# Patient Record
Sex: MALE | Race: WHITE | NOT HISPANIC OR LATINO | Employment: OTHER | URBAN - METROPOLITAN AREA
[De-identification: names, ages, dates, MRNs, and addresses within clinical notes are randomized per-mention and may not be internally consistent; named-entity substitution may affect disease eponyms.]

---

## 2017-07-31 ENCOUNTER — TELEPHONE (OUTPATIENT)
Dept: FAMILY MEDICINE CLINIC | Facility: CLINIC | Age: 67
End: 2017-07-31

## 2017-07-31 RX ORDER — AMOXICILLIN 500 MG/1
CAPSULE ORAL
Qty: 12 CAPSULE | Refills: 0 | Status: SHIPPED | OUTPATIENT
Start: 2017-07-31 | End: 2017-07-31 | Stop reason: SDUPTHER

## 2017-07-31 RX ORDER — AMOXICILLIN 500 MG/1
CAPSULE ORAL
Qty: 12 CAPSULE | Refills: 0 | Status: SHIPPED | OUTPATIENT
Start: 2017-07-31 | End: 2017-08-28 | Stop reason: SDUPTHER

## 2017-08-28 ENCOUNTER — TELEPHONE (OUTPATIENT)
Dept: FAMILY MEDICINE CLINIC | Facility: CLINIC | Age: 67
End: 2017-08-28

## 2017-08-28 RX ORDER — AMOXICILLIN 500 MG/1
CAPSULE ORAL
Qty: 12 CAPSULE | Refills: 0 | Status: SHIPPED | OUTPATIENT
Start: 2017-08-28 | End: 2019-02-22

## 2017-08-28 RX ORDER — AMOXICILLIN 500 MG/1
CAPSULE ORAL
Qty: 12 CAPSULE | Refills: 0 | Status: SHIPPED | OUTPATIENT
Start: 2017-08-28 | End: 2017-08-28 | Stop reason: SDUPTHER

## 2017-08-28 NOTE — TELEPHONE ENCOUNTER
abx for dental work needed.   Has had this in the past - amoxicillan 500mg wants 12-16 as he takes 4 each time he goes to the dentist.

## 2017-10-23 ENCOUNTER — FLU SHOT (OUTPATIENT)
Dept: FAMILY MEDICINE CLINIC | Facility: CLINIC | Age: 67
End: 2017-10-23

## 2017-10-23 PROCEDURE — 90662 IIV NO PRSV INCREASED AG IM: CPT | Performed by: NURSE PRACTITIONER

## 2017-10-23 PROCEDURE — 90471 IMMUNIZATION ADMIN: CPT | Performed by: NURSE PRACTITIONER

## 2017-11-27 ENCOUNTER — LAB (OUTPATIENT)
Dept: LAB | Facility: HOSPITAL | Age: 67
End: 2017-11-27

## 2017-11-27 ENCOUNTER — OFFICE VISIT (OUTPATIENT)
Dept: FAMILY MEDICINE CLINIC | Facility: CLINIC | Age: 67
End: 2017-11-27

## 2017-11-27 VITALS
HEIGHT: 72 IN | HEART RATE: 66 BPM | WEIGHT: 171.8 LBS | SYSTOLIC BLOOD PRESSURE: 128 MMHG | BODY MASS INDEX: 23.27 KG/M2 | DIASTOLIC BLOOD PRESSURE: 82 MMHG | TEMPERATURE: 98.1 F | OXYGEN SATURATION: 97 %

## 2017-11-27 DIAGNOSIS — E78.5 DYSLIPIDEMIA: ICD-10-CM

## 2017-11-27 DIAGNOSIS — Z11.59 NEED FOR HEPATITIS C SCREENING TEST: ICD-10-CM

## 2017-11-27 DIAGNOSIS — Z12.5 SCREENING FOR PROSTATE CANCER: ICD-10-CM

## 2017-11-27 DIAGNOSIS — Z23 NEED FOR PNEUMOCOCCAL VACCINATION: ICD-10-CM

## 2017-11-27 DIAGNOSIS — Z00.00 WELCOME TO MEDICARE PREVENTIVE VISIT: Primary | ICD-10-CM

## 2017-11-27 DIAGNOSIS — N40.1 BENIGN PROSTATIC HYPERPLASIA WITH NOCTURIA: ICD-10-CM

## 2017-11-27 DIAGNOSIS — K21.9 GASTROESOPHAGEAL REFLUX DISEASE, ESOPHAGITIS PRESENCE NOT SPECIFIED: ICD-10-CM

## 2017-11-27 DIAGNOSIS — K22.2 SCHATZKI'S RING: ICD-10-CM

## 2017-11-27 DIAGNOSIS — R35.1 BENIGN PROSTATIC HYPERPLASIA WITH NOCTURIA: ICD-10-CM

## 2017-11-27 PROBLEM — N20.0 RENAL STONES: Status: ACTIVE | Noted: 2017-11-27

## 2017-11-27 PROBLEM — I34.0 NON-RHEUMATIC MITRAL REGURGITATION: Status: ACTIVE | Noted: 2017-11-27

## 2017-11-27 PROBLEM — K57.30 DIVERTICULOSIS OF LARGE INTESTINE WITHOUT HEMORRHAGE: Status: ACTIVE | Noted: 2017-11-27

## 2017-11-27 PROBLEM — I05.1 RHEUMATIC MITRAL REGURGITATION: Status: ACTIVE | Noted: 2017-11-27

## 2017-11-27 PROBLEM — Z86.79 HISTORY OF RHEUMATIC FEVER: Status: ACTIVE | Noted: 2017-11-27

## 2017-11-27 LAB
ALBUMIN SERPL-MCNC: 4.6 G/DL (ref 3.2–4.8)
ALBUMIN/GLOB SERPL: 1.7 G/DL (ref 1.5–2.5)
ALP SERPL-CCNC: 76 U/L (ref 25–100)
ALT SERPL W P-5'-P-CCNC: 33 U/L (ref 7–40)
ANION GAP SERPL CALCULATED.3IONS-SCNC: 3 MMOL/L (ref 3–11)
ARTICHOKE IGE QN: 79 MG/DL (ref 0–130)
AST SERPL-CCNC: 28 U/L (ref 0–33)
BILIRUB SERPL-MCNC: 0.9 MG/DL (ref 0.3–1.2)
BUN BLD-MCNC: 17 MG/DL (ref 9–23)
BUN/CREAT SERPL: 17 (ref 7–25)
CALCIUM SPEC-SCNC: 9.2 MG/DL (ref 8.7–10.4)
CHLORIDE SERPL-SCNC: 108 MMOL/L (ref 99–109)
CHOLEST SERPL-MCNC: 149 MG/DL (ref 0–200)
CO2 SERPL-SCNC: 32 MMOL/L (ref 20–31)
CREAT BLD-MCNC: 1 MG/DL (ref 0.6–1.3)
GFR SERPL CREATININE-BSD FRML MDRD: 75 ML/MIN/1.73
GLOBULIN UR ELPH-MCNC: 2.7 GM/DL
GLUCOSE BLD-MCNC: 91 MG/DL (ref 70–100)
HCV AB SER DONR QL: NORMAL
HDLC SERPL-MCNC: 58 MG/DL (ref 40–60)
POTASSIUM BLD-SCNC: 4.7 MMOL/L (ref 3.5–5.5)
PROT SERPL-MCNC: 7.3 G/DL (ref 5.7–8.2)
PSA SERPL-MCNC: 0.55 NG/ML (ref 0–4)
SODIUM BLD-SCNC: 143 MMOL/L (ref 132–146)
TRIGL SERPL-MCNC: 85 MG/DL (ref 0–150)

## 2017-11-27 PROCEDURE — 90670 PCV13 VACCINE IM: CPT | Performed by: NURSE PRACTITIONER

## 2017-11-27 PROCEDURE — 80053 COMPREHEN METABOLIC PANEL: CPT

## 2017-11-27 PROCEDURE — 80061 LIPID PANEL: CPT

## 2017-11-27 PROCEDURE — G0402 INITIAL PREVENTIVE EXAM: HCPCS | Performed by: NURSE PRACTITIONER

## 2017-11-27 PROCEDURE — 86803 HEPATITIS C AB TEST: CPT

## 2017-11-27 PROCEDURE — 36415 COLL VENOUS BLD VENIPUNCTURE: CPT

## 2017-11-27 PROCEDURE — G0009 ADMIN PNEUMOCOCCAL VACCINE: HCPCS | Performed by: NURSE PRACTITIONER

## 2017-11-27 PROCEDURE — 84153 ASSAY OF PSA TOTAL: CPT

## 2017-11-27 RX ORDER — ATORVASTATIN CALCIUM 20 MG/1
TABLET, FILM COATED ORAL
Refills: 0 | COMMUNITY
Start: 2017-08-29 | End: 2018-01-12 | Stop reason: SDUPTHER

## 2017-11-27 RX ORDER — FLUTICASONE PROPIONATE 50 MCG
2 SPRAY, SUSPENSION (ML) NASAL AS NEEDED
COMMUNITY

## 2017-11-27 NOTE — PROGRESS NOTES
QUICK REFERENCE INFORMATION:  The ABCs of the Annual Wellness Visit    [facility] to Medicare Visit    HEALTH RISK ASSESSMENT    1950    Recent Hospitalizations:  No hospitalization(s) within the last year..      Current Medical Providers:  Patient Care Team:  Vasquez Hoff MD (Inactive) as PCP - General (Internal Medicine)      Smoking Status:  History   Smoking Status   • Never Smoker   Smokeless Tobacco   • Never Used       Alcohol Consumption:  History   Alcohol Use   • Yes     Comment: occasional       Depression Screen:   PHQ-2/PHQ-9 Depression Screening 11/27/2017   Little interest or pleasure in doing things 0   Feeling down, depressed, or hopeless 0   Total Score 0       Health Habits and Functional and Cognitive Screening:  Functional & Cognitive Status 11/27/2017   Do you have difficulty preparing food and eating? No   Do you have difficulty bathing yourself, getting dressed or grooming yourself? No   Do you have difficulty using the toilet? No   Do you have difficulty moving around from place to place? No   Do you have trouble with steps or getting out of a bed or a chair? No   In the past year have you fallen or experienced a near fall? No   Current Diet Well Balanced Diet   Dental Exam Up to date   Eye Exam Not up to date   Exercise (times per week) 3 times per week   Current Exercise Activities Include Bicycling Outdoors   Do you need help using the phone?  No   Are you deaf or do you have serious difficulty hearing?  No   Do you need help with transportation? No   Do you need help shopping? No   Do you need help preparing meals?  No   Do you need help with housework?  No   Do you need help with laundry? No   Do you need help taking your medications? No   Do you need help managing money? No   Have you felt unusual stress, anger or loneliness in the last month? No   Who do you live with? Spouse   If you need help, do you have trouble finding someone available to you? No   Have you been bothered in  the last four weeks by sexual problems? No   Do you have difficulty concentrating, remembering or making decisions? No           Does the patient have evidence of cognitive impairment? No    Aspirin use counseling? Does not need ASA (and currently is not on it)      Recent Lab Results:  CMP:  Lab Results   Component Value Date    BUN 17 11/27/2017    CREATININE 1.00 11/27/2017    EGFRIFNONA 75 11/27/2017    BCR 17.0 11/27/2017     11/27/2017    K 4.7 11/27/2017    CO2 32.0 (H) 11/27/2017    CALCIUM 9.2 11/27/2017    ALBUMIN 4.60 11/27/2017    LABIL2 1.7 11/27/2017    BILITOT 0.9 11/27/2017    ALKPHOS 76 11/27/2017    AST 28 11/27/2017    ALT 33 11/27/2017     Lipid Panel:  Lab Results   Component Value Date    CHOL 149 11/27/2017    TRIG 85 11/27/2017    HDL 58 11/27/2017    LDLDIRECT 79 11/27/2017     HbA1c:       Visual Acuity:  No exam data present    Age-appropriate Screening Schedule:  Refer to the list below for future screening recommendations based on patient's age, sex and/or medical conditions. Orders for these recommended tests are listed in the plan section. The patient has been provided with a written plan.    Health Maintenance   Topic Date Due   • PNEUMOCOCCAL VACCINES (65+ LOW/MEDIUM RISK) (2 of 2 - PPSV23) 11/27/2018   • TDAP/TD VACCINES (2 - Td) 01/01/2020   • COLONOSCOPY  02/01/2023   • INFLUENZA VACCINE  Completed   • ZOSTER VACCINE  Completed        Subjective   History of Present Illness    Claude Lucas is a 67 y.o. male an established patient presenting for a Welcome to Medicare Visit.     The following portions of the patient's history were reviewed and updated as appropriate: allergies, current medications, past family history, past medical history, past social history, past surgical history and problem list.    Outpatient Medications Prior to Visit   Medication Sig Dispense Refill   • amoxicillin (AMOXIL) 500 MG capsule 4 tablets by mouth one hour before procedure 12 capsule 0      No facility-administered medications prior to visit.        Patient Active Problem List   Diagnosis   • Rheumatic mitral regurgitation   • Benign prostatic hyperplasia with nocturia   • Diverticulosis of large intestine without hemorrhage   • Renal stones   • Gastroesophageal reflux disease   • Schatzki's ring   • History of rheumatic fever       Advance Care Planning:  has an advance directive - a copy HAS NOT been provided. Have asked the patient to send this to us to add to record.    Identification of Risk Factors:  Risk factors include: increased fall risk.    Review of Systems   Constitutional: Negative for appetite change, chills, fatigue and fever.   HENT: Negative for congestion, ear pain, rhinorrhea, sinus pressure and sore throat.    Eyes: Negative for itching and visual disturbance.   Respiratory: Negative for cough, shortness of breath and wheezing.    Cardiovascular: Negative for chest pain, palpitations and leg swelling.   Gastrointestinal: Negative for abdominal pain, constipation, diarrhea, nausea and vomiting.   Endocrine: Negative for cold intolerance and heat intolerance.   Genitourinary: Negative for difficulty urinating, dysuria and hematuria.   Musculoskeletal: Negative for arthralgias, back pain, joint swelling and myalgias.   Skin: Negative for rash and wound.   Allergic/Immunologic: Negative for environmental allergies and food allergies.   Neurological: Negative for dizziness and headaches.   Hematological: Negative for adenopathy. Does not bruise/bleed easily.   Psychiatric/Behavioral: Negative for sleep disturbance. The patient is not nervous/anxious.        Compared to one year ago, the patient feels his physical health is worse.  Compared to one year ago, the patient feels his mental health is better.    Objective    Physical Exam   Constitutional: He is oriented to person, place, and time. He appears well-developed and well-nourished. No distress.   HENT:   Head: Normocephalic and  "atraumatic.   Right Ear: External ear normal.   Left Ear: External ear normal.   Nose: Nose normal.   Mouth/Throat: Oropharynx is clear and moist.   Eyes: Conjunctivae and EOM are normal. Pupils are equal, round, and reactive to light. Right eye exhibits no discharge. Left eye exhibits no discharge. No scleral icterus.   Neck: Normal range of motion. Neck supple. No JVD (no bruits) present. No tracheal deviation present. No thyromegaly present.   Cardiovascular: Normal rate, regular rhythm and intact distal pulses.  Exam reveals no gallop and no friction rub.    No murmur heard.  Pulmonary/Chest: Effort normal and breath sounds normal. No respiratory distress. He has no wheezes. He has no rales. He exhibits no tenderness. Right breast exhibits no inverted nipple, no mass, no nipple discharge, no skin change and no tenderness. Left breast exhibits no inverted nipple, no mass, no nipple discharge, no skin change and no tenderness. Breasts are symmetrical.   Abdominal: Soft. Normal appearance and bowel sounds are normal. He exhibits no distension and no mass. There is no hepatosplenomegaly. There is no tenderness. There is no rebound and no guarding. No hernia.   Genitourinary:   Genitourinary Comments: Discussed and deferred   Musculoskeletal: Normal range of motion. He exhibits no edema, tenderness or deformity.   Lymphadenopathy:     He has no cervical adenopathy.   Neurological: He is alert and oriented to person, place, and time. He has normal reflexes. He displays normal reflexes.   Skin: Skin is warm and dry. No rash noted. He is not diaphoretic. No erythema. No pallor.   Psychiatric: He has a normal mood and affect. His behavior is normal. Judgment and thought content normal.   Nursing note and vitals reviewed.      Vitals:    11/27/17 0808   BP: 128/82   Pulse: 66   Temp: 98.1 °F (36.7 °C)   TempSrc: Temporal Artery    SpO2: 97%   Weight: 171 lb 12.8 oz (77.9 kg)   Height: 72\" (182.9 cm)   PainSc: 0-No pain "       Body mass index is 23.3 kg/(m^2).  Discussed the patient's BMI with him. The BMI is in the acceptable range.    Procedure   Procedures       Assessment/Plan   Patient Self-Management and Personalized Health Advice  The patient has been provided with information about: fall prevention and preventive services including:   · Advance directive, Fall Risk assessment done, Fall Risk plan of care done, Influenza vaccine, Pneumococcal vaccine , TdaP vaccine, Zostavax vaccine (Herpes Zoster).    Visit Diagnoses:    ICD-10-CM ICD-9-CM   1. Welcome to Medicare preventive visit Z00.00 V70.0   2. Benign prostatic hyperplasia with nocturia N40.1 600.01    R35.1 788.43   3. Gastroesophageal reflux disease, esophagitis presence not specified K21.9 530.81   4. Schatzki's ring K22.2 750.3   5. Need for hepatitis C screening test Z11.59 V73.89   6. Screening for prostate cancer Z12.5 V76.44   7. Dyslipidemia E78.5 272.4   8. Need for pneumococcal vaccination Z23 V03.82       Orders Placed This Encounter   Procedures   • Comprehensive Metabolic Panel     Standing Status:   Future     Number of Occurrences:   1     Standing Expiration Date:   2018   • HCV Antibody Rfx To Qnt PCR     Standing Status:   Future     Number of Occurrences:   1     Standing Expiration Date:   2018   • Lipid Panel     Standing Status:   Future     Number of Occurrences:   1     Standing Expiration Date:   2018   • PSA     Standing Status:   Future     Number of Occurrences:   1     Standing Expiration Date:   2017       Outpatient Encounter Prescriptions as of 2017   Medication Sig Dispense Refill   • amoxicillin (AMOXIL) 500 MG capsule 4 tablets by mouth one hour before procedure 12 capsule 0   • atorvastatin (LIPITOR) 20 MG tablet TK 1 T PO QD  0   • fluticasone (FLONASE) 50 MCG/ACT nasal spray 2 sprays into each nostril As Needed for Rhinitis.     • [] pneumococcal conj. 13-valent (PREVNAR-13) vaccine 0.5 mL         No facility-administered encounter medications on file as of 11/27/2017.        Reviewed use of high risk medication in the elderly: not applicable  Reviewed for potential of harmful drug interactions in the elderly: not applicable    Follow Up:  Return in about 1 year (around 11/27/2018), or if symptoms worsen or fail to improve, for Recheck, Annual physical, Medicare Wellness.     An After Visit Summary and PPPS with all of these plans were given to the patient.

## 2018-01-12 RX ORDER — ATORVASTATIN CALCIUM 20 MG/1
20 TABLET, FILM COATED ORAL DAILY
Qty: 90 TABLET | Refills: 1 | Status: SHIPPED | OUTPATIENT
Start: 2018-01-12 | End: 2018-07-16 | Stop reason: SDUPTHER

## 2018-01-12 NOTE — TELEPHONE ENCOUNTER
E-Rx Atorvastatin 20 mg tablets, take 1 tablet po qd #90, 1 refill to [facility] on [facility].

## 2018-01-12 NOTE — TELEPHONE ENCOUNTER
ATORVASTATIN 20 MG TAKE1 PER DAY, 90 DAY SUPPLY     PHARMACY HAS SENT IN A REQUEST LAST WEEK. NOW PATIENT HAS NO MEDS LEFT.     [address]

## 2018-07-16 ENCOUNTER — TELEPHONE (OUTPATIENT)
Dept: FAMILY MEDICINE CLINIC | Facility: CLINIC | Age: 68
End: 2018-07-16

## 2018-07-16 RX ORDER — ATORVASTATIN CALCIUM 20 MG/1
20 TABLET, FILM COATED ORAL DAILY
Qty: 90 TABLET | Refills: 1 | Status: SHIPPED | OUTPATIENT
Start: 2018-07-16 | End: 2019-01-24 | Stop reason: SDUPTHER

## 2018-10-11 ENCOUNTER — FLU SHOT (OUTPATIENT)
Dept: FAMILY MEDICINE CLINIC | Facility: CLINIC | Age: 68
End: 2018-10-11

## 2018-10-11 DIAGNOSIS — Z23 FLU VACCINE NEED: Primary | ICD-10-CM

## 2018-10-11 PROCEDURE — 90662 IIV NO PRSV INCREASED AG IM: CPT | Performed by: NURSE PRACTITIONER

## 2018-10-11 PROCEDURE — G0008 ADMIN INFLUENZA VIRUS VAC: HCPCS | Performed by: NURSE PRACTITIONER

## 2019-01-24 RX ORDER — ATORVASTATIN CALCIUM 20 MG/1
TABLET, FILM COATED ORAL
Qty: 30 TABLET | Refills: 0 | Status: SHIPPED | OUTPATIENT
Start: 2019-01-24 | End: 2019-02-22 | Stop reason: SDUPTHER

## 2019-02-22 ENCOUNTER — OFFICE VISIT (OUTPATIENT)
Dept: FAMILY MEDICINE CLINIC | Facility: CLINIC | Age: 69
End: 2019-02-22

## 2019-02-22 VITALS
WEIGHT: 165 LBS | HEIGHT: 72 IN | OXYGEN SATURATION: 97 % | HEART RATE: 76 BPM | DIASTOLIC BLOOD PRESSURE: 74 MMHG | SYSTOLIC BLOOD PRESSURE: 124 MMHG | BODY MASS INDEX: 22.35 KG/M2

## 2019-02-22 DIAGNOSIS — Z00.00 WELL ADULT EXAM: Primary | ICD-10-CM

## 2019-02-22 DIAGNOSIS — E78.00 PURE HYPERCHOLESTEROLEMIA: ICD-10-CM

## 2019-02-22 DIAGNOSIS — Z23 NEED FOR PNEUMOCOCCAL VACCINE: ICD-10-CM

## 2019-02-22 DIAGNOSIS — I34.1 MVP (MITRAL VALVE PROLAPSE): ICD-10-CM

## 2019-02-22 DIAGNOSIS — Z71.84 TRAVEL ADVICE ENCOUNTER: ICD-10-CM

## 2019-02-22 PROCEDURE — G0439 PPPS, SUBSEQ VISIT: HCPCS | Performed by: FAMILY MEDICINE

## 2019-02-22 PROCEDURE — G0009 ADMIN PNEUMOCOCCAL VACCINE: HCPCS | Performed by: FAMILY MEDICINE

## 2019-02-22 PROCEDURE — 90732 PPSV23 VACC 2 YRS+ SUBQ/IM: CPT | Performed by: FAMILY MEDICINE

## 2019-02-22 RX ORDER — ATORVASTATIN CALCIUM 20 MG/1
20 TABLET, FILM COATED ORAL NIGHTLY
Qty: 90 TABLET | Refills: 1 | Status: SHIPPED | OUTPATIENT
Start: 2019-02-22 | End: 2019-08-22 | Stop reason: SDUPTHER

## 2019-02-22 NOTE — PATIENT INSTRUCTIONS
Advised him to call in a month before his planned trip to take Atovaquone/proguanil 1-2 days prior to, during, and 7 days post exposure for malaria prophylaxis. Typhoid vaccine at pharmacy. Hepatitis A vaccine at pharmacy.       Medicare Wellness  Personal Prevention Plan of Service     Date of Office Visit:  2019  Encounter Provider:  Monica lAvarado MD  Place of Service:  [facility]  Patient Name: Claude Lucas  :  1950    As part of the Medicare Wellness portion of your visit today, we are providing you with this personalized preventive plan of services (PPPS). This plan is based upon recommendations of the [place] Preventive Services Task Force (USPSTF) and the Advisory Committee on Immunization Practices (ACIP).    This lists the preventive care services that should be considered, and provides dates of when you are due. Items listed as completed are up-to-date and do not require any further intervention.    Health Maintenance   Topic Date Due   • ZOSTER VACCINE (3 of 3) 2015   • MEDICARE ANNUAL WELLNESS  2018   • PNEUMOCOCCAL VACCINES (65+ LOW/MEDIUM RISK) (2 of 2 - PPSV23) 2018   • LIPID PANEL  2019   • TDAP/TD VACCINES (2 - Td) 2020   • COLONOSCOPY  2023   • HEPATITIS C SCREENING  Completed   • INFLUENZA VACCINE  Completed       Orders Placed This Encounter   Procedures   • Pneumococcal polysaccharide vaccine 23-valent >= 3yo subcutaneous/IM (PPSV23)       Return in about 6 months (around 2019) for Follow-up.

## 2019-08-14 ENCOUNTER — TELEPHONE (OUTPATIENT)
Dept: FAMILY MEDICINE CLINIC | Facility: CLINIC | Age: 69
End: 2019-08-14

## 2019-08-15 ENCOUNTER — TRANSCRIBE ORDERS (OUTPATIENT)
Dept: PULMONOLOGY | Facility: HOSPITAL | Age: 69
End: 2019-08-15

## 2019-08-15 DIAGNOSIS — G47.61 PERIODIC LIMB MOVEMENT DISORDER: Primary | ICD-10-CM

## 2019-08-15 DIAGNOSIS — G47.33 OBSTRUCTIVE SLEEP APNEA (ADULT) (PEDIATRIC): ICD-10-CM

## 2019-08-22 ENCOUNTER — APPOINTMENT (OUTPATIENT)
Dept: LAB | Facility: HOSPITAL | Age: 69
End: 2019-08-22

## 2019-08-22 ENCOUNTER — OFFICE VISIT (OUTPATIENT)
Dept: FAMILY MEDICINE CLINIC | Facility: CLINIC | Age: 69
End: 2019-08-22

## 2019-08-22 VITALS
HEIGHT: 72 IN | DIASTOLIC BLOOD PRESSURE: 80 MMHG | SYSTOLIC BLOOD PRESSURE: 124 MMHG | WEIGHT: 166 LBS | OXYGEN SATURATION: 96 % | BODY MASS INDEX: 22.48 KG/M2 | HEART RATE: 66 BPM

## 2019-08-22 DIAGNOSIS — R06.83 SNORING: ICD-10-CM

## 2019-08-22 DIAGNOSIS — Z71.84 TRAVEL ADVICE ENCOUNTER: ICD-10-CM

## 2019-08-22 DIAGNOSIS — E78.00 PURE HYPERCHOLESTEROLEMIA: Primary | ICD-10-CM

## 2019-08-22 LAB
CHOLEST SERPL-MCNC: 126 MG/DL (ref 0–200)
HDLC SERPL-MCNC: 57 MG/DL (ref 40–60)
LDLC SERPL CALC-MCNC: 54 MG/DL (ref 0–100)
LDLC/HDLC SERPL: 0.95 {RATIO}
TRIGL SERPL-MCNC: 73 MG/DL (ref 0–150)
VLDLC SERPL-MCNC: 14.6 MG/DL (ref 5–40)

## 2019-08-22 PROCEDURE — 99214 OFFICE O/P EST MOD 30 MIN: CPT | Performed by: FAMILY MEDICINE

## 2019-08-22 PROCEDURE — 80061 LIPID PANEL: CPT | Performed by: FAMILY MEDICINE

## 2019-08-22 RX ORDER — ATOVAQUONE AND PROGUANIL HYDROCHLORIDE 250; 100 MG/1; MG/1
1 TABLET, FILM COATED ORAL
Qty: 20 TABLET | Refills: 0 | Status: SHIPPED | OUTPATIENT
Start: 2019-08-22 | End: 2019-09-11

## 2019-08-22 RX ORDER — ATORVASTATIN CALCIUM 20 MG/1
20 TABLET, FILM COATED ORAL NIGHTLY
Qty: 90 TABLET | Refills: 1 | Status: SHIPPED | OUTPATIENT
Start: 2019-08-22 | End: 2020-02-27 | Stop reason: SDUPTHER

## 2019-08-22 NOTE — PROGRESS NOTES
Chief Complaint   Patient presents with   • Hyperlipidemia   • Snoring     has snoring issues, wears snore guard. Dentist referred to have sleep study to confirm sleep apnea.    • travel advice     wanting prescription for malaria, traveling to [facility] in December        Hyperlipidemia   This is a chronic problem. The problem is controlled. Current antihyperlipidemic treatment includes statins and exercise. There are no compliance problems.         Snoring:  Issues for 10-15 years. Snore guard not functioning well. Getting a work-up for sleep apnea. BP was elevated to 135. Some daytime tiredness. Falls asleep watching tv. Wakes up in middle of night to use bathroom or awake the rest of the morning. Wife denies witnessed apnea. Denies choking or gasps. 10 years ago had onset as he went to sleep legs would jump and kick, used to take a small dose of medication. Once he started cycling it improved and stopped medication.     Travel advice:  Trip to [facility] 12/7-12/8 needs malaria prophylaxis. Uses wrist band pressure points for motion sickness.     Review of Systems   Constitutional: Positive for fatigue.   HENT:        Snoring   Respiratory: Negative for apnea and choking.    Psychiatric/Behavioral: Positive for sleep disturbance.        Current Outpatient Medications on File Prior to Visit   Medication Sig Dispense Refill   • fluticasone (FLONASE) 50 MCG/ACT nasal spray 2 sprays into each nostril As Needed for Rhinitis.     • [DISCONTINUED] atorvastatin (LIPITOR) 20 MG tablet Take 1 tablet by mouth Every Night. 90 tablet 1     No current facility-administered medications on file prior to visit.        No Known Allergies    Past Medical History:   Diagnosis Date   • Allergic     seasonal   • Benign prostatic hyperplasia    • Diverticulosis    • GERD (gastroesophageal reflux disease)     with schatzkis ring status post esophageal dilation   • Headache    • History of snoring     with resolution using bi guard   •  "Hyperlipidemia    • Kidney stones    • Mitral valve prolapse    • Rheumatic fever    • RLS (restless legs syndrome)    • Vitreous disorder     seperation with residual floaters        Past Surgical History:   Procedure Laterality Date   • SKIN BIOPSY      4 SKs and AKs    • WISDOM TOOTH EXTRACTION          Family History   Problem Relation Age of Onset   • Breast cancer Mother 39   • Heart disease Father    • Hypertension Father    • Diabetes type I Father    • Mitral valve prolapse Brother    • Other Brother         endocarditis after dental procedure    • Psoriasis Daughter    • Arthritis Daughter    • Psoriasis Cousin    • Arthritis Cousin    • Colon cancer Neg Hx    • Lung cancer Neg Hx         Social History     Socioeconomic History   • Marital status:      Spouse name: Not on file   • Number of children: Not on file   • Years of education: Not on file   • Highest education level: Not on file   Occupational History   • Occupation:     Tobacco Use   • Smoking status: Never Smoker   • Smokeless tobacco: Never Used   Substance and Sexual Activity   • Alcohol use: Yes     Comment: occasional   • Drug use: No   Social History Narrative    Cyclist         Visit Vitals  /80 (BP Location: Left arm, Patient Position: Sitting, Cuff Size: Adult)   Pulse 66   Ht 182.9 cm (72\")   Wt 75.3 kg (166 lb)   SpO2 96%   BMI 22.51 kg/m²        Physical Exam   Constitutional: No distress.   Cardiovascular: Normal rate and regular rhythm.   No murmur heard.  Pulmonary/Chest: Effort normal and breath sounds normal.   Psychiatric: He has a normal mood and affect.   Vitals reviewed.           Claude was seen today for hyperlipidemia, snoring and travel advice.    Diagnoses and all orders for this visit:    Pure hypercholesterolemia  -     Lipid Panel; Future  -     atorvastatin (LIPITOR) 20 MG tablet; Take 1 tablet by mouth Every Night.  Check fasting labs today.  Continue statin.  Snoring  Patient has an " appointment in the sleep lab on October 15, 2019.  He is followed by sleep clinic.  He is hopeful that he can have a new oral appliance will not use CPAP.  Travel advice encounter  -     atovaquone-proguanil (MALARONE) 250-100 MG tablet per tablet; Take 1 tablet by mouth Daily for 20 days.  Malaria prophylaxis 2 days before in 1 week following his trip.  Recommend to bring Imodium with him if he experiences diarrhea.      Return in about 6 months (around 2/21/2020) for Medicare Wellness.

## 2019-10-03 ENCOUNTER — CLINICAL SUPPORT (OUTPATIENT)
Dept: FAMILY MEDICINE CLINIC | Facility: CLINIC | Age: 69
End: 2019-10-03

## 2019-10-03 DIAGNOSIS — Z23 IMMUNIZATION DUE: Primary | ICD-10-CM

## 2019-10-03 PROCEDURE — 90653 IIV ADJUVANT VACCINE IM: CPT | Performed by: FAMILY MEDICINE

## 2019-10-03 PROCEDURE — G0008 ADMIN INFLUENZA VIRUS VAC: HCPCS | Performed by: FAMILY MEDICINE

## 2019-10-15 ENCOUNTER — HOSPITAL ENCOUNTER (OUTPATIENT)
Dept: SLEEP MEDICINE | Facility: HOSPITAL | Age: 69
Discharge: HOME | End: 2019-10-15
Admitting: INTERNAL MEDICINE

## 2019-10-15 VITALS
HEIGHT: 72 IN | WEIGHT: 169.75 LBS | SYSTOLIC BLOOD PRESSURE: 115 MMHG | BODY MASS INDEX: 22.99 KG/M2 | DIASTOLIC BLOOD PRESSURE: 62 MMHG | HEART RATE: 63 BPM | OXYGEN SATURATION: 95 %

## 2019-10-15 DIAGNOSIS — G47.33 OBSTRUCTIVE SLEEP APNEA (ADULT) (PEDIATRIC): ICD-10-CM

## 2019-10-15 DIAGNOSIS — G47.61 PERIODIC LIMB MOVEMENT DISORDER: ICD-10-CM

## 2019-10-15 PROCEDURE — 95810 POLYSOM 6/> YRS 4/> PARAM: CPT

## 2020-02-27 ENCOUNTER — LAB (OUTPATIENT)
Dept: LAB | Facility: HOSPITAL | Age: 70
End: 2020-02-27

## 2020-02-27 ENCOUNTER — OFFICE VISIT (OUTPATIENT)
Dept: FAMILY MEDICINE CLINIC | Facility: CLINIC | Age: 70
End: 2020-02-27

## 2020-02-27 VITALS
BODY MASS INDEX: 22.89 KG/M2 | WEIGHT: 169 LBS | DIASTOLIC BLOOD PRESSURE: 74 MMHG | SYSTOLIC BLOOD PRESSURE: 122 MMHG | TEMPERATURE: 97.6 F | HEIGHT: 72 IN | HEART RATE: 65 BPM | OXYGEN SATURATION: 96 %

## 2020-02-27 DIAGNOSIS — K22.2 SCHATZKI'S RING: ICD-10-CM

## 2020-02-27 DIAGNOSIS — Z13.0 SCREENING FOR DEFICIENCY ANEMIA: ICD-10-CM

## 2020-02-27 DIAGNOSIS — Z13.1 SCREENING FOR DIABETES MELLITUS: ICD-10-CM

## 2020-02-27 DIAGNOSIS — Z12.5 SCREENING FOR MALIGNANT NEOPLASM OF PROSTATE: ICD-10-CM

## 2020-02-27 DIAGNOSIS — R35.1 BENIGN PROSTATIC HYPERPLASIA WITH NOCTURIA: ICD-10-CM

## 2020-02-27 DIAGNOSIS — E78.00 PURE HYPERCHOLESTEROLEMIA: ICD-10-CM

## 2020-02-27 DIAGNOSIS — Z83.71 FH: COLON POLYPS: ICD-10-CM

## 2020-02-27 DIAGNOSIS — N40.1 BENIGN PROSTATIC HYPERPLASIA WITH NOCTURIA: ICD-10-CM

## 2020-02-27 DIAGNOSIS — Z00.00 WELL ADULT EXAM: Primary | ICD-10-CM

## 2020-02-27 PROBLEM — Z83.719 FH: COLON POLYPS: Status: ACTIVE | Noted: 2020-02-27

## 2020-02-27 LAB
ALBUMIN SERPL-MCNC: 4.9 G/DL (ref 3.5–5.2)
ALBUMIN/GLOB SERPL: 2 G/DL
ALP SERPL-CCNC: 66 U/L (ref 39–117)
ALT SERPL W P-5'-P-CCNC: 24 U/L (ref 1–41)
ANION GAP SERPL CALCULATED.3IONS-SCNC: 11.3 MMOL/L (ref 5–15)
AST SERPL-CCNC: 23 U/L (ref 1–40)
BASOPHILS # BLD AUTO: 0.05 10*3/MM3 (ref 0–0.2)
BASOPHILS NFR BLD AUTO: 0.9 % (ref 0–1.5)
BILIRUB SERPL-MCNC: 0.7 MG/DL (ref 0.2–1.2)
BUN BLD-MCNC: 15 MG/DL (ref 8–23)
BUN/CREAT SERPL: 14.2 (ref 7–25)
CALCIUM SPEC-SCNC: 10.1 MG/DL (ref 8.6–10.5)
CHLORIDE SERPL-SCNC: 102 MMOL/L (ref 98–107)
CHOLEST SERPL-MCNC: 145 MG/DL (ref 0–200)
CO2 SERPL-SCNC: 28.7 MMOL/L (ref 22–29)
CREAT BLD-MCNC: 1.06 MG/DL (ref 0.76–1.27)
DEPRECATED RDW RBC AUTO: 40.9 FL (ref 37–54)
EOSINOPHIL # BLD AUTO: 0.15 10*3/MM3 (ref 0–0.4)
EOSINOPHIL NFR BLD AUTO: 2.8 % (ref 0.3–6.2)
ERYTHROCYTE [DISTWIDTH] IN BLOOD BY AUTOMATED COUNT: 12 % (ref 12.3–15.4)
GFR SERPL CREATININE-BSD FRML MDRD: 70 ML/MIN/1.73
GLOBULIN UR ELPH-MCNC: 2.5 GM/DL
GLUCOSE BLD-MCNC: 86 MG/DL (ref 65–99)
HCT VFR BLD AUTO: 44.7 % (ref 37.5–51)
HDLC SERPL-MCNC: 65 MG/DL (ref 40–60)
HGB BLD-MCNC: 15.2 G/DL (ref 13–17.7)
IMM GRANULOCYTES # BLD AUTO: 0.01 10*3/MM3 (ref 0–0.05)
IMM GRANULOCYTES NFR BLD AUTO: 0.2 % (ref 0–0.5)
LDLC SERPL CALC-MCNC: 70 MG/DL (ref 0–100)
LDLC/HDLC SERPL: 1.08 {RATIO}
LYMPHOCYTES # BLD AUTO: 1.3 10*3/MM3 (ref 0.7–3.1)
LYMPHOCYTES NFR BLD AUTO: 24.4 % (ref 19.6–45.3)
MCH RBC QN AUTO: 31.7 PG (ref 26.6–33)
MCHC RBC AUTO-ENTMCNC: 34 G/DL (ref 31.5–35.7)
MCV RBC AUTO: 93.1 FL (ref 79–97)
MONOCYTES # BLD AUTO: 0.5 10*3/MM3 (ref 0.1–0.9)
MONOCYTES NFR BLD AUTO: 9.4 % (ref 5–12)
NEUTROPHILS # BLD AUTO: 3.32 10*3/MM3 (ref 1.7–7)
NEUTROPHILS NFR BLD AUTO: 62.3 % (ref 42.7–76)
NRBC BLD AUTO-RTO: 0 /100 WBC (ref 0–0.2)
PLATELET # BLD AUTO: 230 10*3/MM3 (ref 140–450)
PMV BLD AUTO: 11 FL (ref 6–12)
POTASSIUM BLD-SCNC: 4.4 MMOL/L (ref 3.5–5.2)
PROT SERPL-MCNC: 7.4 G/DL (ref 6–8.5)
PSA SERPL-MCNC: 0.7 NG/ML (ref 0–4)
RBC # BLD AUTO: 4.8 10*6/MM3 (ref 4.14–5.8)
SODIUM BLD-SCNC: 142 MMOL/L (ref 136–145)
TRIGL SERPL-MCNC: 48 MG/DL (ref 0–150)
VLDLC SERPL-MCNC: 9.6 MG/DL (ref 5–40)
WBC NRBC COR # BLD: 5.33 10*3/MM3 (ref 3.4–10.8)

## 2020-02-27 PROCEDURE — 80061 LIPID PANEL: CPT

## 2020-02-27 PROCEDURE — 85025 COMPLETE CBC W/AUTO DIFF WBC: CPT

## 2020-02-27 PROCEDURE — G0439 PPPS, SUBSEQ VISIT: HCPCS | Performed by: FAMILY MEDICINE

## 2020-02-27 PROCEDURE — 80053 COMPREHEN METABOLIC PANEL: CPT

## 2020-02-27 PROCEDURE — G0103 PSA SCREENING: HCPCS

## 2020-02-27 RX ORDER — ATORVASTATIN CALCIUM 20 MG/1
20 TABLET, FILM COATED ORAL NIGHTLY
Qty: 90 TABLET | Refills: 3 | Status: SHIPPED | OUTPATIENT
Start: 2020-02-27 | End: 2021-03-03 | Stop reason: SDUPTHER

## 2020-02-27 NOTE — PROGRESS NOTES
The ABCs of the Annual Wellness Visit  Subsequent Medicare Wellness Visit    Chief Complaint   Patient presents with   • Medicare Wellness-subsequent       Subjective   History of Present Illness:  Claude Lucas is a 69 y.o. male who presents for a Subsequent Medicare Wellness Visit.    10 years ago had trouble swallowing, had EGD and expanded the ring and improved. Starting to notice swallowing thing come back. Has to drink extra water.     Saw  dental clinic for snore guard. Had sleep study showed not sleep apnea.     Nocturia x2 nightly. No difficulty urinating or straining. No h/o PSA elevation.         HEALTH RISK ASSESSMENT    Recent Hospitalizations:  No hospitalization(s) within the last year.    Current Medical Providers:  Patient Care Team:  Monica Storey MD as PCP - General (Family Medicine)  Monica Storey MD as PCP - Claims Attributed    Smoking Status:  Social History     Tobacco Use   Smoking Status Never Smoker   Smokeless Tobacco Never Used       Alcohol Consumption:  Social History     Substance and Sexual Activity   Alcohol Use Yes    Comment: occasional       Depression Screen:   PHQ-2/PHQ-9 Depression Screening 2/27/2020   Little interest or pleasure in doing things 0   Feeling down, depressed, or hopeless 0   Total Score 0       Fall Risk Screen:  STEKEVIN Fall Risk Assessment was completed, and patient is at LOW risk for falls.Assessment completed on:2/27/2020   STEKEVIN Fall Risk Clinician Key Questions   Have you fallen in the past year?: No  Do you feel unsteady with walking?: No  Are you worried about falling?: No    Stay Idependant Patient Questions   Have you been advised to use a cane or a walker to get around safely?: No  Do you steady yourself by holding onto furniture when walking at home?: No  Do you need to push with your hands to stand up from a chair?: No  Do you have trouble stepping up onto a curb?: No  Do you have to rush to the toilet?: No  Have you  lost some feeling in your feet?: No  Do you take medicine that sometimes makes you feel light headed or more tired than usual?: No  Do you take medicine to help you sleep or improve your mood?: No  Do you often feel sad or depressed?: No  Patient Fall Risk Assessment Score : 0        Health Habits and Functional and Cognitive Screening:  Functional & Cognitive Status 2/27/2020   Do you have difficulty preparing food and eating? No   Do you have difficulty bathing yourself, getting dressed or grooming yourself? No   Do you have difficulty using the toilet? No   Do you have difficulty moving around from place to place? No   Do you have trouble with steps or getting out of a bed or a chair? No   Current Diet Well Balanced Diet   Dental Exam Up to date   Eye Exam Not up to date   Exercise (times per week) 2 times per week   Current Exercise Activities Include Cardiovasular Workout on Exercise Equipment   Do you need help using the phone?  No   Are you deaf or do you have serious difficulty hearing?  No   Do you need help with transportation? No   Do you need help shopping? No   Do you need help preparing meals?  No   Do you need help with housework?  No   Do you need help with laundry? No   Do you need help taking your medications? No   Do you need help managing money? No   Do you ever drive or ride in a car without wearing a seat belt? No   Have you felt unusual stress, anger or loneliness in the last month? No   Who do you live with? Spouse   If you need help, do you have trouble finding someone available to you? No   Have you been bothered in the last four weeks by sexual problems? No   Do you have difficulty concentrating, remembering or making decisions? No         Does the patient have evidence of cognitive impairment? No    Asprin use counseling:Does not need ASA (and currently is not on it)    Age-appropriate Screening Schedule:  Refer to the list below for future screening recommendations based on patient's  age, sex and/or medical conditions. Orders for these recommended tests are listed in the plan section. The patient has been provided with a written plan.    Health Maintenance   Topic Date Due   • TDAP/TD VACCINES (2 - Td) 01/01/2020   • LIPID PANEL  08/22/2020   • COLONOSCOPY  03/29/2023   • INFLUENZA VACCINE  Completed   • ZOSTER VACCINE  Completed          Immunization History   Administered Date(s) Administered   • Fluzone High Dose =>65 Years ([facility] ONLY) 10/23/2017, 10/11/2018, 10/03/2019   • Pneumococcal Conjugate 13-Valent (PCV13) 11/27/2017   • Pneumococcal Polysaccharide (PPSV23) 02/22/2019   • Shingrix 06/04/2019   • Tdap 01/01/2010   • Zostavax 01/01/2015       The following portions of the patient's history were reviewed and updated as appropriate: He  has a past medical history of Allergic, Benign prostatic hyperplasia, Diverticulosis, GERD (gastroesophageal reflux disease), Headache, History of snoring, Hyperlipidemia, Kidney stones, Mitral valve prolapse, Rheumatic fever, RLS (restless legs syndrome), and Vitreous disorder.  He  has a past surgical history that includes Skin biopsy and [place] tooth extraction.  His family history includes Arthritis in his cousin and daughter; Breast cancer (age of onset: 39) in his mother; Colon polyps in his father; Diabetes type I in his father; Heart disease in his father; Hypertension in his father; Mitral valve prolapse in his brother; Other in his brother; Psoriasis in his cousin and daughter.  He  reports that he has never smoked. He has never used smokeless tobacco. He reports that he drinks alcohol. He reports that he does not use drugs.  He has No Known Allergies..    Outpatient Medications Prior to Visit   Medication Sig Dispense Refill   • fluticasone (FLONASE) 50 MCG/ACT nasal spray 2 sprays into each nostril As Needed for Rhinitis.     • atorvastatin (LIPITOR) 20 MG tablet Take 1 tablet by mouth Every Night. 90 tablet 1     No facility-administered  "medications prior to visit.        Patient Active Problem List   Diagnosis   • Rheumatic mitral regurgitation   • Benign prostatic hyperplasia with nocturia   • Diverticulosis of large intestine without hemorrhage   • Renal stones   • Gastroesophageal reflux disease   • Schatzki's ring   • History of rheumatic fever   • Pure hypercholesterolemia   • MVP (mitral valve prolapse)   • FH: colon polyps       Advanced Care Planning:  ACP discussion was held with the patient during this visit. Patient has an advance directive that is valid in EMR.     Review of Systems   HENT: Negative for hearing loss.    Neurological: Negative for light-headedness.   Psychiatric/Behavioral: Negative for dysphoric mood.       Compared to one year ago, the patient feels his physical health is the same.  Compared to one year ago, the patient feels his mental health is the same.    Reviewed chart for potential of high risk medication in the elderly: not applicable  Reviewed chart for potential of harmful drug interactions in the elderly:not applicable    Objective         Vitals:    02/27/20 0807   BP: 122/74   BP Location: Right arm   Patient Position: Sitting   Cuff Size: Adult   Pulse: 65   Temp: 97.6 °F (36.4 °C)   TempSrc: Temporal   SpO2: 96%   Weight: 76.7 kg (169 lb)   Height: 183 cm (72.05\")   PainSc: 0-No pain       Body mass index is 22.89 kg/m².  Discussed the patient's BMI with him. The BMI is in the acceptable range.    Physical Exam   Constitutional: He is oriented to person, place, and time. No distress.   HENT:   Right Ear: Tympanic membrane and ear canal normal.   Left Ear: Tympanic membrane and ear canal normal.   Nose: Nose normal.   Mouth/Throat: Oropharynx is clear and moist and mucous membranes are normal. No oral lesions.   Eyes: Right eye exhibits no discharge. Left eye exhibits no discharge.   Neck: Neck supple. No thyromegaly present.   Cardiovascular: Normal rate and regular rhythm.   Murmur ( late systolic, apex) " heard.  Pulmonary/Chest: Effort normal and breath sounds normal.   Abdominal: Soft. Bowel sounds are normal. There is no tenderness.   Genitourinary: Rectal exam shows no tenderness. Prostate is enlarged ( mild, non-nodular). Prostate is not tender.   Musculoskeletal: He exhibits no edema.   Lymphadenopathy:        Head (right side): No submandibular, no preauricular and no posterior auricular adenopathy present.        Head (left side): No submandibular, no preauricular and no posterior auricular adenopathy present.     He has no cervical adenopathy.   Neurological: He is alert and oriented to person, place, and time.   Skin: Skin is warm and dry.   Psychiatric: He has a normal mood and affect. His behavior is normal. Judgment and thought content normal.             Assessment/Plan   Medicare Risks and Personalized Health Plan  CMS Preventative Services Quick Reference  Advance Directive Discussion  Diabetic Lab Screening   Immunizations Discussed/Encouraged (specific immunizations; Td and Shingrix )  Prostate Cancer Screening     Counseled on health maintenance topics and preventative care recommendations: Td vaccine, prostate cancer screening, colon cancer screening, diabetes screening     The above risks/problems have been discussed with the patient.  Pertinent information has been shared with the patient in the After Visit Summary.  Follow up plans and orders are seen below in the Assessment/Plan Section.    Diagnoses and all orders for this visit:    1. Well adult exam (Primary)  Check fasting labs today.   Obtain most recent immunization records from pharmacy. Recommend Td booster.   2. Pure hypercholesterolemia  -     Lipid Panel; Future  -     atorvastatin (LIPITOR) 20 MG tablet; Take 1 tablet by mouth Every Night.  Dispense: 90 tablet; Refill: 3  Continue statin.   3. Benign prostatic hyperplasia with nocturia  Stable.   4. Screening for deficiency anemia  -     CBC & Differential; Future    5. Screening  for diabetes mellitus  -     Comprehensive Metabolic Panel; Future    6. Screening for malignant neoplasm of prostate  -     PSA Screen; Future    7. FH: colon polyps  5 year screenings.   8. Schatzki's ring  Starting to notice intermittent trouble swallowing. If worsening, refer back to Dr. Carr.     Follow Up:  Return in about 1 year (around 2/27/2021) for Medicare Wellness.   Recommend repeating fasting labs a few days before next appointment.     An After Visit Summary and PPPS were given to the patient.

## 2020-02-27 NOTE — PATIENT INSTRUCTIONS
Medicare Wellness  Personal Prevention Plan of Service     Date of Office Visit:  2020  Encounter Provider:  Monica Alvarado MD  Place of Service:  [facility]  Patient Name: Claude Lucas  :  1950    As part of the Medicare Wellness portion of your visit today, we are providing you with this personalized preventive plan of services (PPPS). This plan is based upon recommendations of the [place] Preventive Services Task Force (USPSTF) and the Advisory Committee on Immunization Practices (ACIP).    This lists the preventive care services that should be considered, and provides dates of when you are due. Items listed as completed are up-to-date and do not require any further intervention.    Health Maintenance   Topic Date Due   • TDAP/TD VACCINES (2 - Td) 2020   • LIPID PANEL  2020   • MEDICARE ANNUAL WELLNESS  2021   • COLONOSCOPY  2023   • HEPATITIS C SCREENING  Completed   • Pneumococcal Vaccine Once at 65 Years Old  Completed   • INFLUENZA VACCINE  Completed   • ZOSTER VACCINE  Completed       Orders Placed This Encounter   Procedures   • Comprehensive Metabolic Panel     Standing Status:   Future     Standing Expiration Date:   2021   • Lipid Panel     Standing Status:   Future     Standing Expiration Date:   2021   • PSA Screen     Standing Status:   Future     Standing Expiration Date:   2021   • CBC & Differential     Standing Status:   Future     Standing Expiration Date:   2021     Order Specific Question:   Manual Differential     Answer:   No       Return in about 1 year (around 2021) for Medicare Wellness.            Diphtheria Toxoid; Tetanus Toxoid Adsorbed, DT, Td  What is this medicine?  DIPHTHERIA AND TETANUS TOXOIDS ADSORBED (dif THEER ee uh and TET n us TOK soids ad SAWRB) is a vaccine. It is used to prevent infections of diphtheria and tetanus (lockjaw).  This medicine may be used for  other purposes; ask your health care provider or pharmacist if you have questions.  COMMON BRAND NAME(S): DECAVAC, TDVAX, TENIVAC  What should I tell my health care provider before I take this medicine?  They need to know if you have any of these conditions:  -bleeding disorder  -immune system problems  -infection with fever  -low levels of platelets in the blood  -an unusual or allergic reaction to diphtheria or tetanus toxoid, latex, thimerosal, other medicines, foods, dyes, or preservatives  -pregnant or trying to get pregnant  -breast-feeding  How should I use this medicine?  This vaccine is for injection into a muscle. It is given by a health care professional.  A copy of Vaccine Information Statements will be given before each vaccination. Read this sheet carefully each time. The sheet may change frequently.  Talk to your pediatrician regarding the use of this medicine in children. While this drug may be prescribed for selected conditions, precautions do apply.  Overdosage: If you think you have taken too much of this medicine contact a poison control center or emergency room at once.  NOTE: This medicine is only for you. Do not share this medicine with others.  What if I miss a dose?  Keep appointments for follow-up (booster) doses as directed. It is important not to miss your dose. Call your doctor or health care professional if you are unable to keep an appointment.  What may interact with this medicine?  -adalimumab  -anakinra  -infliximab  -live vaccines  -medicines that suppress your immune system  -medicines to treat cancer  -medicines that treat or prevent blood clots like daily aspirin, enoxaparin, heparin, ticlopidine, warfarin  -radiopharmaceuticals like iodine I-125 or I-131  This list may not describe all possible interactions. Give your health care provider a list of all the medicines, herbs, non-prescription drugs, or dietary supplements you use. Also tell them if you smoke, drink alcohol, or  use illegal drugs. Some items may interact with your medicine.  What should I watch for while using this medicine?  Contact your doctor or health care professional and seek emergency medical care if any serious side effects occur.  This vaccine, like all vaccines, may not fully protect everyone.  What side effects may I notice from receiving this medicine?  Side effects that you should report to your doctor or health care professional as soon as possible:  -allergic reactions like skin rash, itching or hives, swelling of the face, lips, or tongue  -arthritis pain  -breathing problems  -changes in hearing  -extreme changes in behavior  -fast, irregular heartbeat  -fever over 100 degrees F  -pain, tingling, numbness in the hands or feet  -seizures  -unusually weak or tired  Side effects that usually do not require medical attention (report to your doctor or health care professional if they continue or are bothersome):  -aches or pains  -bruising, pain, swelling at site where injected  -headache  -loss of appetite  -low-grade fever of 100 degrees F or less  -nausea, vomiting  -sleepy  -swollen glands  This list may not describe all possible side effects. Call your doctor for medical advice about side effects. You may report side effects to FDA at 9-185-FDA-3104.  Where should I keep my medicine?  This drug is given in a hospital or clinic and will not be stored at home.  NOTE: This sheet is a summary. It may not cover all possible information. If you have questions about this medicine, talk to your doctor, pharmacist, or health care provider.  © 2020 [employer] (2009-04-16 13:57:36)

## 2020-12-20 ENCOUNTER — APPOINTMENT (OUTPATIENT)
Dept: GENERAL RADIOLOGY | Facility: HOSPITAL | Age: 70
End: 2020-12-20

## 2020-12-20 ENCOUNTER — APPOINTMENT (OUTPATIENT)
Dept: MRI IMAGING | Facility: HOSPITAL | Age: 70
End: 2020-12-20

## 2020-12-20 ENCOUNTER — HOSPITAL ENCOUNTER (EMERGENCY)
Facility: HOSPITAL | Age: 70
Discharge: HOME | End: 2020-12-20
Attending: EMERGENCY MEDICINE | Admitting: EMERGENCY MEDICINE

## 2020-12-20 VITALS
DIASTOLIC BLOOD PRESSURE: 70 MMHG | OXYGEN SATURATION: 95 % | WEIGHT: 175 LBS | HEIGHT: 72 IN | BODY MASS INDEX: 23.7 KG/M2 | SYSTOLIC BLOOD PRESSURE: 130 MMHG | HEART RATE: 57 BPM | RESPIRATION RATE: 16 BRPM | TEMPERATURE: 98.1 F

## 2020-12-20 DIAGNOSIS — G45.4 TRANSIENT GLOBAL AMNESIA: Primary | ICD-10-CM

## 2020-12-20 LAB
ALBUMIN SERPL-MCNC: 4.6 G/DL (ref 3.5–5.2)
ALBUMIN/GLOB SERPL: 1.5 G/DL
ALP SERPL-CCNC: 78 U/L (ref 39–117)
ALT SERPL W P-5'-P-CCNC: 28 U/L (ref 1–41)
ANION GAP SERPL CALCULATED.3IONS-SCNC: 10 MMOL/L (ref 5–15)
AST SERPL-CCNC: 27 U/L (ref 1–40)
BASOPHILS # BLD AUTO: 0.06 10*3/MM3 (ref 0–0.2)
BASOPHILS NFR BLD AUTO: 0.9 % (ref 0–1.5)
BILIRUB SERPL-MCNC: 0.6 MG/DL (ref 0–1.2)
BILIRUB UR QL STRIP: NEGATIVE
BUN SERPL-MCNC: 16 MG/DL (ref 8–23)
BUN/CREAT SERPL: 16.3 (ref 7–25)
CALCIUM SPEC-SCNC: 10.4 MG/DL (ref 8.6–10.5)
CHLORIDE SERPL-SCNC: 101 MMOL/L (ref 98–107)
CLARITY UR: CLEAR
CO2 SERPL-SCNC: 29 MMOL/L (ref 22–29)
COLOR UR: YELLOW
CREAT SERPL-MCNC: 0.98 MG/DL (ref 0.76–1.27)
DEPRECATED RDW RBC AUTO: 45.4 FL (ref 37–54)
EOSINOPHIL # BLD AUTO: 0.11 10*3/MM3 (ref 0–0.4)
EOSINOPHIL NFR BLD AUTO: 1.6 % (ref 0.3–6.2)
ERYTHROCYTE [DISTWIDTH] IN BLOOD BY AUTOMATED COUNT: 13.1 % (ref 12.3–15.4)
GFR SERPL CREATININE-BSD FRML MDRD: 76 ML/MIN/1.73
GLOBULIN UR ELPH-MCNC: 3.1 GM/DL
GLUCOSE SERPL-MCNC: 87 MG/DL (ref 65–99)
GLUCOSE UR STRIP-MCNC: NEGATIVE MG/DL
HCT VFR BLD AUTO: 49.8 % (ref 37.5–51)
HGB BLD-MCNC: 16.1 G/DL (ref 13–17.7)
HGB UR QL STRIP.AUTO: NEGATIVE
HOLD SPECIMEN: NORMAL
HOLD SPECIMEN: NORMAL
IMM GRANULOCYTES # BLD AUTO: 0.02 10*3/MM3 (ref 0–0.05)
IMM GRANULOCYTES NFR BLD AUTO: 0.3 % (ref 0–0.5)
KETONES UR QL STRIP: NEGATIVE
LEUKOCYTE ESTERASE UR QL STRIP.AUTO: NEGATIVE
LYMPHOCYTES # BLD AUTO: 1.73 10*3/MM3 (ref 0.7–3.1)
LYMPHOCYTES NFR BLD AUTO: 25 % (ref 19.6–45.3)
MAGNESIUM SERPL-MCNC: 2.1 MG/DL (ref 1.6–2.4)
MCH RBC QN AUTO: 30.6 PG (ref 26.6–33)
MCHC RBC AUTO-ENTMCNC: 32.3 G/DL (ref 31.5–35.7)
MCV RBC AUTO: 94.5 FL (ref 79–97)
MONOCYTES # BLD AUTO: 0.55 10*3/MM3 (ref 0.1–0.9)
MONOCYTES NFR BLD AUTO: 8 % (ref 5–12)
NEUTROPHILS NFR BLD AUTO: 4.44 10*3/MM3 (ref 1.7–7)
NEUTROPHILS NFR BLD AUTO: 64.2 % (ref 42.7–76)
NITRITE UR QL STRIP: NEGATIVE
NRBC BLD AUTO-RTO: 0 /100 WBC (ref 0–0.2)
PH UR STRIP.AUTO: 6 [PH] (ref 5–8)
PLATELET # BLD AUTO: 245 10*3/MM3 (ref 140–450)
PMV BLD AUTO: 10.1 FL (ref 6–12)
POTASSIUM SERPL-SCNC: 4.3 MMOL/L (ref 3.5–5.2)
PROT SERPL-MCNC: 7.7 G/DL (ref 6–8.5)
PROT UR QL STRIP: NEGATIVE
QT INTERVAL: 392 MS
QTC INTERVAL: 384 MS
RBC # BLD AUTO: 5.27 10*6/MM3 (ref 4.14–5.8)
SODIUM SERPL-SCNC: 140 MMOL/L (ref 136–145)
SP GR UR STRIP: 1.01 (ref 1–1.03)
TROPONIN T SERPL-MCNC: <0.01 NG/ML (ref 0–0.03)
UROBILINOGEN UR QL STRIP: NORMAL
WBC # BLD AUTO: 6.91 10*3/MM3 (ref 3.4–10.8)
WHOLE BLOOD HOLD SPECIMEN: NORMAL
WHOLE BLOOD HOLD SPECIMEN: NORMAL

## 2020-12-20 PROCEDURE — 99284 EMERGENCY DEPT VISIT MOD MDM: CPT

## 2020-12-20 PROCEDURE — 70551 MRI BRAIN STEM W/O DYE: CPT

## 2020-12-20 PROCEDURE — 80053 COMPREHEN METABOLIC PANEL: CPT | Performed by: EMERGENCY MEDICINE

## 2020-12-20 PROCEDURE — 93005 ELECTROCARDIOGRAM TRACING: CPT

## 2020-12-20 PROCEDURE — 71045 X-RAY EXAM CHEST 1 VIEW: CPT

## 2020-12-20 PROCEDURE — 85025 COMPLETE CBC W/AUTO DIFF WBC: CPT | Performed by: EMERGENCY MEDICINE

## 2020-12-20 PROCEDURE — 81003 URINALYSIS AUTO W/O SCOPE: CPT | Performed by: EMERGENCY MEDICINE

## 2020-12-20 PROCEDURE — 84484 ASSAY OF TROPONIN QUANT: CPT | Performed by: EMERGENCY MEDICINE

## 2020-12-20 PROCEDURE — 93005 ELECTROCARDIOGRAM TRACING: CPT | Performed by: EMERGENCY MEDICINE

## 2020-12-20 PROCEDURE — 83735 ASSAY OF MAGNESIUM: CPT | Performed by: EMERGENCY MEDICINE

## 2020-12-20 RX ORDER — SODIUM CHLORIDE 0.9 % (FLUSH) 0.9 %
10 SYRINGE (ML) INJECTION AS NEEDED
Status: DISCONTINUED | OUTPATIENT
Start: 2020-12-20 | End: 2020-12-20 | Stop reason: HOSPADM

## 2020-12-20 RX ORDER — CLONIDINE HYDROCHLORIDE 0.1 MG/1
0.1 TABLET ORAL ONCE
Status: DISCONTINUED | OUTPATIENT
Start: 2020-12-20 | End: 2020-12-20 | Stop reason: HOSPADM

## 2020-12-20 NOTE — ED PROVIDER NOTES
Subjective   Pt is a 71 yo male presenting to ED by EMS with AMS. PMHx significant for HLD, MVP, GERD, RLS, BPH, Kidney stones and Diverticulosis. Pt's wife helping provide majority of history. She explains this morning he did his usual routine and cycled in basement and then took a shower. Shortly after shower around noon they were discussing ordering lunch and he began repeating things and stated he felt confused. Wife states he kept asking if he took a shower. Wife denies any facial droop or slurred speech. She did a quick exam and didn't notice any limb weakness. On arrival to ED patient confused and repeating questions. He does not recognize people he just saw. He complains of a mild headache but otherwise denies dizziness, vision changes, CP, SOB, cough, N/V/D, abdominal pain, urinary sx or loss of taste/smell. He is able to recall details that are accurate regarding his life / work / family prior to this morning and confirmed correct by wife. No reports of recent head injury or new medication. Pt denies tobacco, drug or ETOH use. No known Covid exposure.       History provided by:  Medical records, patient and relative      Review of Systems   Constitutional: Negative for fever.   HENT: Negative for congestion, sore throat and trouble swallowing.    Eyes: Negative for visual disturbance.   Respiratory: Negative for cough and shortness of breath.    Cardiovascular: Negative for chest pain and leg swelling.   Gastrointestinal: Negative for abdominal pain, blood in stool, diarrhea, nausea and vomiting.   Genitourinary: Negative for difficulty urinating, dysuria and flank pain.   Musculoskeletal: Negative for arthralgias, back pain and joint swelling.   Skin: Negative.  Negative for rash and wound.   Allergic/Immunologic: Negative.    Neurological: Positive for headaches. Negative for dizziness, syncope, weakness and numbness.   Psychiatric/Behavioral: Positive for confusion.   All other systems reviewed and are  negative.      Past Medical History:   Diagnosis Date   • Allergic     seasonal   • Benign prostatic hyperplasia    • Diverticulosis    • GERD (gastroesophageal reflux disease)     with schatzkis ring status post esophageal dilation   • Headache    • History of snoring     with resolution using bi guard   • Hyperlipidemia    • Kidney stones    • Mitral valve prolapse    • Rheumatic fever    • RLS (restless legs syndrome)    • Vitreous disorder     seperation with residual floaters       No Known Allergies    Past Surgical History:   Procedure Laterality Date   • SKIN BIOPSY      4 SKs and AKs    • WISDOM TOOTH EXTRACTION         Family History   Problem Relation Age of Onset   • Breast cancer Mother 39   • Heart disease Father    • Hypertension Father    • Diabetes type I Father    • Colon polyps Father    • Mitral valve prolapse Brother    • Other Brother         endocarditis after dental procedure    • Psoriasis Daughter    • Arthritis Daughter    • Psoriasis Cousin    • Arthritis Cousin    • Colon cancer Neg Hx    • Lung cancer Neg Hx        Social History     Socioeconomic History   • Marital status:      Spouse name: Not on file   • Number of children: Not on file   • Years of education: Not on file   • Highest education level: Not on file   Occupational History   • Occupation:     Tobacco Use   • Smoking status: Never Smoker   • Smokeless tobacco: Never Used   Substance and Sexual Activity   • Alcohol use: Yes     Comment: occasional   • Drug use: No   Social History Narrative    Cyclist            Objective   Physical Exam  Vitals signs and nursing note reviewed.   Constitutional:       Appearance: He is well-developed.   HENT:      Head: Atraumatic.      Nose: Nose normal.   Eyes:      General: Lids are normal.      Conjunctiva/sclera: Conjunctivae normal.      Pupils: Pupils are equal, round, and reactive to light.   Neck:      Musculoskeletal: Normal range of motion and neck supple.    Cardiovascular:      Rate and Rhythm: Normal rate and regular rhythm.      Heart sounds: Normal heart sounds.   Pulmonary:      Effort: Pulmonary effort is normal.      Breath sounds: Normal breath sounds.   Abdominal:      General: There is no distension.      Palpations: Abdomen is soft.      Tenderness: There is no abdominal tenderness. There is no guarding or rebound.   Musculoskeletal: Normal range of motion.         General: No tenderness or deformity.   Skin:     General: Skin is warm and dry.   Neurological:      Mental Status: He is alert and oriented to person, place, and time.      Cranial Nerves: Cranial nerves are intact.      Sensory: Sensation is intact. No sensory deficit.      Motor: Motor function is intact.      Comments: Repeating questions that were just answered. Pleasantly confused. Able to recall details on life prior to this morning.    Psychiatric:         Mood and Affect: Mood normal.         Speech: Speech normal.         Behavior: Behavior normal.         Procedures           ED Course  ED Course as of Dec 20 2123   Sun Dec 20, 2020   1400 Discussed patient and presentation with Dr. Ramires. Will consult Neurology.     [RT]   1403 Discussed patient with Neurologist Dr. Neri. Will obtain MRI. If confusion resolves and negative ED workup, patient can be discharged home. If persistent confusion will admit.     [RT]      ED Course User Index  [RT] May Angel PA      Re-examined patient several times in ED. Throughout ED course patient gradually had resolution of confusion. Still some lack of details of recent memory but back to normal per wife. Discussed results and tx plan. Will dc home with close f/u with PCP. They will return to ED if new or worse sx.     Reviewed old records.      Recent Results (from the past 24 hour(s))   Comprehensive Metabolic Panel    Collection Time: 12/20/20  1:36 PM    Specimen: Blood   Result Value Ref Range    Glucose 87 65 - 99 mg/dL    BUN 16 8 -  23 mg/dL    Creatinine 0.98 0.76 - 1.27 mg/dL    Sodium 140 136 - 145 mmol/L    Potassium 4.3 3.5 - 5.2 mmol/L    Chloride 101 98 - 107 mmol/L    CO2 29.0 22.0 - 29.0 mmol/L    Calcium 10.4 8.6 - 10.5 mg/dL    Total Protein 7.7 6.0 - 8.5 g/dL    Albumin 4.60 3.50 - 5.20 g/dL    ALT (SGPT) 28 1 - 41 U/L    AST (SGOT) 27 1 - 40 U/L    Alkaline Phosphatase 78 39 - 117 U/L    Total Bilirubin 0.6 0.0 - 1.2 mg/dL    eGFR Non African Amer 76 >60 mL/min/1.73    Globulin 3.1 gm/dL    A/G Ratio 1.5 g/dL    BUN/Creatinine Ratio 16.3 7.0 - 25.0    Anion Gap 10.0 5.0 - 15.0 mmol/L   Troponin    Collection Time: 12/20/20  1:36 PM    Specimen: Blood   Result Value Ref Range    Troponin T <0.010 0.000 - 0.030 ng/mL   Magnesium    Collection Time: 12/20/20  1:36 PM    Specimen: Blood   Result Value Ref Range    Magnesium 2.1 1.6 - 2.4 mg/dL   Light Blue Top    Collection Time: 12/20/20  1:36 PM   Result Value Ref Range    Extra Tube hold for add-on    Green Top (Gel)    Collection Time: 12/20/20  1:36 PM   Result Value Ref Range    Extra Tube Hold for add-ons.    Lavender Top    Collection Time: 12/20/20  1:36 PM   Result Value Ref Range    Extra Tube hold for add-on    Gold Top - SST    Collection Time: 12/20/20  1:36 PM   Result Value Ref Range    Extra Tube Hold for add-ons.    CBC Auto Differential    Collection Time: 12/20/20  1:36 PM    Specimen: Blood   Result Value Ref Range    WBC 6.91 3.40 - 10.80 10*3/mm3    RBC 5.27 4.14 - 5.80 10*6/mm3    Hemoglobin 16.1 13.0 - 17.7 g/dL    Hematocrit 49.8 37.5 - 51.0 %    MCV 94.5 79.0 - 97.0 fL    MCH 30.6 26.6 - 33.0 pg    MCHC 32.3 31.5 - 35.7 g/dL    RDW 13.1 12.3 - 15.4 %    RDW-SD 45.4 37.0 - 54.0 fl    MPV 10.1 6.0 - 12.0 fL    Platelets 245 140 - 450 10*3/mm3    Neutrophil % 64.2 42.7 - 76.0 %    Lymphocyte % 25.0 19.6 - 45.3 %    Monocyte % 8.0 5.0 - 12.0 %    Eosinophil % 1.6 0.3 - 6.2 %    Basophil % 0.9 0.0 - 1.5 %    Immature Grans % 0.3 0.0 - 0.5 %    Neutrophils,  Absolute 4.44 1.70 - 7.00 10*3/mm3    Lymphocytes, Absolute 1.73 0.70 - 3.10 10*3/mm3    Monocytes, Absolute 0.55 0.10 - 0.90 10*3/mm3    Eosinophils, Absolute 0.11 0.00 - 0.40 10*3/mm3    Basophils, Absolute 0.06 0.00 - 0.20 10*3/mm3    Immature Grans, Absolute 0.02 0.00 - 0.05 10*3/mm3    nRBC 0.0 0.0 - 0.2 /100 WBC   ECG 12 Lead    Collection Time: 12/20/20  1:36 PM   Result Value Ref Range    QT Interval 392 ms    QTC Interval 384 ms   Urinalysis With Microscopic If Indicated (No Culture) - Urine, Clean Catch    Collection Time: 12/20/20  2:21 PM    Specimen: Urine, Clean Catch   Result Value Ref Range    Color, UA Yellow Yellow, Straw    Appearance, UA Clear Clear    pH, UA 6.0 5.0 - 8.0    Specific Gravity, UA 1.012 1.001 - 1.030    Glucose, UA Negative Negative    Ketones, UA Negative Negative    Bilirubin, UA Negative Negative    Blood, UA Negative Negative    Protein, UA Negative Negative    Leuk Esterase, UA Negative Negative    Nitrite, UA Negative Negative    Urobilinogen, UA 0.2 E.U./dL 0.2 - 1.0 E.U./dL     Note: In addition to lab results from this visit, the labs listed above may include labs taken at another facility or during a different encounter within the last 24 hours. Please correlate lab times with ED admission and discharge times for further clarification of the services performed during this visit.    XR Chest 1 View   Final Result   No evidence of acute disease in the chest.        This report was finalized on 12/20/2020 4:59 PM by Jaswant Livingston.          MRI Brain Without Contrast   Final Result   Normal noncontrast MRI of the brain with no evidence of   ischemia, hemorrhage, mass or mass effect.           This report was finalized on 12/20/2020 3:39 PM by Jaswant Livingston.            Vitals:    12/20/20 1601 12/20/20 1605 12/20/20 1630 12/20/20 1700   BP:   131/73 130/70   BP Location:       Patient Position:       Pulse: 58 60 63 57   Resp:       Temp:       TempSrc:       SpO2: 95%  94% 95% 95%   Weight:       Height:         Medications - No data to display  ECG/EMG Results (last 24 hours)     Procedure Component Value Units Date/Time    ECG 12 Lead [[id number]] Collected: 12/20/20 1336     Updated: 12/20/20 1546     QT Interval 392 ms      QTC Interval 384 ms     Narrative:      Test Reason : Weak/Dizzy/AMS protocol  Blood Pressure : **/** mmHG  Vent. Rate : 058 BPM     Atrial Rate : 058 BPM     P-R Int : 156 ms          QRS Dur : 096 ms      QT Int : 392 ms       P-R-T Axes : 038 009 062 degrees     QTc Int : 384 ms    Sinus bradycardia  Otherwise normal ECG  No previous ECGs available  Confirmed by KERMIT PALAFOX MD ([id number]) on 12/20/2020 3:46:39 PM    Referred By:  EDMD           Confirmed By:KERMIT PALAFOX MD        ECG 12 Lead   Final Result   Test Reason : Weak/Dizzy/AMS protocol   Blood Pressure : **/** mmHG   Vent. Rate : 058 BPM     Atrial Rate : 058 BPM      P-R Int : 156 ms          QRS Dur : 096 ms       QT Int : 392 ms       P-R-T Axes : 038 009 062 degrees      QTc Int : 384 ms      Sinus bradycardia   Otherwise normal ECG   No previous ECGs available   Confirmed by KERMIT PALAFOX MD ([id number]) on 12/20/2020 3:46:39 PM      Referred By:  EDMD           Confirmed By:KERMIT PALAFOX MD            COVID-19 RISK SCREEN     1. Has the patient had close contact without PPE with a lab confirmed COVID-19 (+) person or a person under investigation (PUI) for COVID-19 infection?  -- No     2. Has the patient had respiratory symptoms, worsened/new cough and/or SOA, unexplained fever, or sudden loss of smell and/or taste in the past 7 days? --  No    3. Does the patient have baseline higher exposure risk such as working in healthcare field, currently residing in healthcare facility, or ongoing hemodialysis?  --  No           DISCHARGE    Patient discharged in stable condition.    Reviewed implications of results, diagnosis, meds, responsibility to follow up, warning signs and symptoms of possible  worsening, potential complications and reasons to return to ER.    Patient/Family voiced understanding of above instructions.    Discussed plan for discharge, as there is no emergent indication for admission.  Pt/family is agreeable and understands need for follow up and possible repeat testing.  Pt/family is aware that discharge does not mean that nothing is wrong but that it indicates no emergency is currently present that requires admission and they must continue care with follow-up as given below or with a physician of their choice.     FOLLOW-UP  Monica Storey MD  [address] [facility] [id number] [phone]    Schedule an appointment as soon as possible for a visit       Yahaira Lord MD  [address] [facility]  [address]  [facility] [address] [phone]      As needed    [facility] Emergency Department  [address] [address] [phone]    If symptoms worsen         Medication List      No changes were made to your prescriptions during this visit.                                         MDM    Final diagnoses:   Transient global amnesia            May Angel PA  12/20/20 2126

## 2020-12-23 ENCOUNTER — OFFICE VISIT (OUTPATIENT)
Dept: FAMILY MEDICINE CLINIC | Facility: CLINIC | Age: 70
End: 2020-12-23

## 2020-12-23 VITALS
SYSTOLIC BLOOD PRESSURE: 126 MMHG | BODY MASS INDEX: 23.54 KG/M2 | HEIGHT: 72 IN | HEART RATE: 66 BPM | OXYGEN SATURATION: 100 % | DIASTOLIC BLOOD PRESSURE: 72 MMHG | WEIGHT: 173.8 LBS

## 2020-12-23 DIAGNOSIS — G45.4 TRANSIENT GLOBAL AMNESIA: Primary | ICD-10-CM

## 2020-12-23 PROCEDURE — 99213 OFFICE O/P EST LOW 20 MIN: CPT | Performed by: FAMILY MEDICINE

## 2020-12-23 RX ORDER — FEXOFENADINE HCL 180 MG/1
180 TABLET ORAL DAILY
COMMUNITY

## 2020-12-23 NOTE — PROGRESS NOTES
"Chief Complaint   Patient presents with   • Memory Loss     patient reports that since the incident he has not had any further issues        Memory Loss  This is a new problem. The current episode started in the past 7 days. The problem has been gradually improving. Pertinent negatives include no weakness. Nothing aggravates the symptoms. He has tried nothing for the symptoms.   Neurologic Problem  The patient's primary symptoms include memory loss. The patient's pertinent negatives include no focal sensory loss, focal weakness, slurred speech or weakness. This is a new problem. The current episode started in the past 7 days. The neurological problem developed suddenly. The problem has been gradually improving since onset. Past treatments include nothing. There is no history of a CVA, dementia, head trauma or seizures.    Answers for HPI/ROS submitted by the patient on 12/23/2020   What is the primary reason for your visit?: Other  Please describe your symptoms.: This is to follow up Transient Global Amnesia episode on Sunday, 12/20, which resulted in emergency room visit at [facility].  Have you had these symptoms before?: No  How long have you been having these symptoms?: 1-4 days  Please list any medications you are currently taking for this condition.: Atorvastatin, Fluticasone, Fexofenadrine  Please describe any probable cause for these symptoms. : Bicycle  ride followed by shower?      Patient presented to the emergency department by ambulance on 12/20/2020 for sudden onset confusion around 1 pm. He was trying to order take out on his phone and stated something was wrong, \"I'm confused.\"He was repeating himself. Wife did stroke tests on him. He was evaluated in the emergency department and diagnosed with transient global amnesia. That day he remembers exercising, doing work from home, fireplace starting, and brushing teeth but didn't remember the shower. He had towel and bath mat placed in the " wrong areas. The next thing he remembered was waking up for MRI in ED. He felt like he was dreaming. Wife reports he started improving around 4:30-5pm while at ED. He was able to recall immediate memory.   On Halloween wife was positive for COVID and she stay isolated. He never had symptoms.     Review of Systems   Neurological: Positive for memory problem. Negative for focal weakness and weakness.   Psychiatric/Behavioral: Positive for memory loss.        Patient Active Problem List   Diagnosis   • Rheumatic mitral regurgitation   • Benign prostatic hyperplasia with nocturia   • Diverticulosis of large intestine without hemorrhage   • Renal stones   • Gastroesophageal reflux disease   • Schatzki's ring   • History of rheumatic fever   • Pure hypercholesterolemia   • MVP (mitral valve prolapse)   • FH: colon polyps       Current Outpatient Medications   Medication Sig Dispense Refill   • atorvastatin (LIPITOR) 20 MG tablet Take 1 tablet by mouth Every Night. 90 tablet 3   • fexofenadine (ALLEGRA) 180 MG tablet Take 180 mg by mouth Daily.     • fluticasone (FLONASE) 50 MCG/ACT nasal spray 2 sprays into each nostril As Needed for Rhinitis.       No current facility-administered medications for this visit.        No Known Allergies    Past Medical History:   Diagnosis Date   • Allergic     seasonal   • Benign prostatic hyperplasia    • Diverticulosis    • GERD (gastroesophageal reflux disease)     with schatzkis ring status post esophageal dilation   • Headache    • History of snoring     with resolution using bi guard   • Hyperlipidemia    • Kidney stones    • Mitral valve prolapse    • Rheumatic fever    • RLS (restless legs syndrome)    • Transient global amnesia 12/20/2020   • Vitreous disorder     seperation with residual floaters        Past Surgical History:   Procedure Laterality Date   • SKIN BIOPSY      4 SKs and AKs    • WISDOM TOOTH EXTRACTION          Family History   Problem Relation Age of Onset   •  "Breast cancer Mother 39   • Heart disease Father    • Hypertension Father    • Diabetes type I Father    • Colon polyps Father    • Mitral valve prolapse Brother    • Other Brother         endocarditis after dental procedure    • Psoriasis Daughter    • Arthritis Daughter    • Psoriasis Cousin    • Arthritis Cousin    • Colon cancer Neg Hx    • Lung cancer Neg Hx         Social History     Socioeconomic History   • Marital status:      Spouse name: Not on file   • Number of children: Not on file   • Years of education: Not on file   • Highest education level: Not on file   Occupational History   • Occupation:     Tobacco Use   • Smoking status: Never Smoker   • Smokeless tobacco: Never Used   Substance and Sexual Activity   • Alcohol use: Yes     Comment: occasional   • Drug use: No   Social History Narrative    Cyclist         Vitals:    12/23/20 1018   BP: 126/72   BP Location: Left arm   Patient Position: Sitting   Cuff Size: Adult   Pulse: 66   SpO2: 100%   Weight: 78.8 kg (173 lb 12.8 oz)   Height: 182.9 cm (72\")      Body mass index is 23.57 kg/m².    Physical Exam  Vitals signs reviewed.   Constitutional:       General: He is not in acute distress.     Appearance: He is not ill-appearing.   Cardiovascular:      Rate and Rhythm: Normal rate and regular rhythm.      Heart sounds: No murmur.   Pulmonary:      Effort: Pulmonary effort is normal.      Breath sounds: Normal breath sounds.   Neurological:      General: No focal deficit present.      Mental Status: He is alert and oriented to person, place, and time. Mental status is at baseline.      Cranial Nerves: No cranial nerve deficit.      Motor: No weakness.      Gait: Gait normal.   Psychiatric:         Mood and Affect: Mood normal.         Behavior: Behavior normal.         Thought Content: Thought content normal.         Judgment: Judgment normal.         Results for orders placed or performed during the hospital encounter of 12/20/20 "   Comprehensive Metabolic Panel    Specimen: Blood   Result Value Ref Range    Glucose 87 65 - 99 mg/dL    BUN 16 8 - 23 mg/dL    Creatinine 0.98 0.76 - 1.27 mg/dL    Sodium 140 136 - 145 mmol/L    Potassium 4.3 3.5 - 5.2 mmol/L    Chloride 101 98 - 107 mmol/L    CO2 29.0 22.0 - 29.0 mmol/L    Calcium 10.4 8.6 - 10.5 mg/dL    Total Protein 7.7 6.0 - 8.5 g/dL    Albumin 4.60 3.50 - 5.20 g/dL    ALT (SGPT) 28 1 - 41 U/L    AST (SGOT) 27 1 - 40 U/L    Alkaline Phosphatase 78 39 - 117 U/L    Total Bilirubin 0.6 0.0 - 1.2 mg/dL    eGFR Non African Amer 76 >60 mL/min/1.73    Globulin 3.1 gm/dL    A/G Ratio 1.5 g/dL    BUN/Creatinine Ratio 16.3 7.0 - 25.0    Anion Gap 10.0 5.0 - 15.0 mmol/L   Troponin    Specimen: Blood   Result Value Ref Range    Troponin T <0.010 0.000 - 0.030 ng/mL   Magnesium    Specimen: Blood   Result Value Ref Range    Magnesium 2.1 1.6 - 2.4 mg/dL   Urinalysis With Microscopic If Indicated (No Culture) - Urine, Clean Catch    Specimen: Urine, Clean Catch   Result Value Ref Range    Color, UA Yellow Yellow, Straw    Appearance, UA Clear Clear    pH, UA 6.0 5.0 - 8.0    Specific Gravity, UA 1.012 1.001 - 1.030    Glucose, UA Negative Negative    Ketones, UA Negative Negative    Bilirubin, UA Negative Negative    Blood, UA Negative Negative    Protein, UA Negative Negative    Leuk Esterase, UA Negative Negative    Nitrite, UA Negative Negative    Urobilinogen, UA 0.2 E.U./dL 0.2 - 1.0 E.U./dL   CBC Auto Differential    Specimen: Blood   Result Value Ref Range    WBC 6.91 3.40 - 10.80 10*3/mm3    RBC 5.27 4.14 - 5.80 10*6/mm3    Hemoglobin 16.1 13.0 - 17.7 g/dL    Hematocrit 49.8 37.5 - 51.0 %    MCV 94.5 79.0 - 97.0 fL    MCH 30.6 26.6 - 33.0 pg    MCHC 32.3 31.5 - 35.7 g/dL    RDW 13.1 12.3 - 15.4 %    RDW-SD 45.4 37.0 - 54.0 fl    MPV 10.1 6.0 - 12.0 fL    Platelets 245 140 - 450 10*3/mm3    Neutrophil % 64.2 42.7 - 76.0 %    Lymphocyte % 25.0 19.6 - 45.3 %    Monocyte % 8.0 5.0 - 12.0 %     Eosinophil % 1.6 0.3 - 6.2 %    Basophil % 0.9 0.0 - 1.5 %    Immature Grans % 0.3 0.0 - 0.5 %    Neutrophils, Absolute 4.44 1.70 - 7.00 10*3/mm3    Lymphocytes, Absolute 1.73 0.70 - 3.10 10*3/mm3    Monocytes, Absolute 0.55 0.10 - 0.90 10*3/mm3    Eosinophils, Absolute 0.11 0.00 - 0.40 10*3/mm3    Basophils, Absolute 0.06 0.00 - 0.20 10*3/mm3    Immature Grans, Absolute 0.02 0.00 - 0.05 10*3/mm3    nRBC 0.0 0.0 - 0.2 /100 WBC   ECG 12 Lead   Result Value Ref Range    QT Interval 392 ms    QTC Interval 384 ms   Light Blue Top   Result Value Ref Range    Extra Tube hold for add-on    Green Top (Gel)   Result Value Ref Range    Extra Tube Hold for add-ons.    Lavender Top   Result Value Ref Range    Extra Tube hold for add-on    Gold Top - SST   Result Value Ref Range    Extra Tube Hold for add-ons.      Mri Brain Without Contrast    Result Date: 12/20/2020  Normal noncontrast MRI of the brain with no evidence of ischemia, hemorrhage, mass or mass effect.   This report was finalized on 12/20/2020 3:39 PM by Jaswant Livingston.      Xr Chest 1 View    Result Date: 12/20/2020  No evidence of acute disease in the chest.  This report was finalized on 12/20/2020 4:59 PM by Jaswant Livingston.         Diagnoses and all orders for this visit:    1. Transient global amnesia (Primary)      Reviewed labs, EKG, MRI in detail along with patient and spouse.  He had full recovery within hours.  Discussed that he will likely have permanent amnesia for the time.  During the events.  He has been able to return to work.  Discussed that there is a small chance of recurrence.  Any new onset neurological symptoms need to be promptly evaluated in the emergency department given his age.  Not a risk factor for stroke.  Return in about 2 months (around 2/28/2021) for Medicare Wellness.     Electronically signed by Monica Alvarado MD, 12/23/20, 11:07 AM EST.

## 2021-02-24 ENCOUNTER — PATIENT MESSAGE (OUTPATIENT)
Dept: FAMILY MEDICINE CLINIC | Facility: CLINIC | Age: 71
End: 2021-02-24

## 2021-02-24 DIAGNOSIS — Z13.0 SCREENING FOR DEFICIENCY ANEMIA: ICD-10-CM

## 2021-02-24 DIAGNOSIS — E78.00 PURE HYPERCHOLESTEROLEMIA: Primary | ICD-10-CM

## 2021-02-24 DIAGNOSIS — Z13.1 SCREENING FOR DIABETES MELLITUS: ICD-10-CM

## 2021-02-24 DIAGNOSIS — Z12.5 SCREENING FOR MALIGNANT NEOPLASM OF PROSTATE: ICD-10-CM

## 2021-02-24 NOTE — TELEPHONE ENCOUNTER
From: Claude Lucas  To: Monica Alvarado MD  Sent: 2/24/2021 7:30 AM EST  Subject: Non-Urgent Medical Question    DR RIGGINS - can I get bloodwork prior to appointment to make sure we can review at the appointment on 3/3/21, 9 AM?   Please advise. I can come by for blood draw ahead of time if you want.   Thanks - Milo

## 2021-03-01 ENCOUNTER — LAB (OUTPATIENT)
Dept: LAB | Facility: HOSPITAL | Age: 71
End: 2021-03-01

## 2021-03-01 DIAGNOSIS — E78.00 PURE HYPERCHOLESTEROLEMIA: ICD-10-CM

## 2021-03-01 DIAGNOSIS — Z13.1 SCREENING FOR DIABETES MELLITUS: ICD-10-CM

## 2021-03-01 DIAGNOSIS — Z12.5 SCREENING FOR MALIGNANT NEOPLASM OF PROSTATE: ICD-10-CM

## 2021-03-01 DIAGNOSIS — Z13.0 SCREENING FOR DEFICIENCY ANEMIA: ICD-10-CM

## 2021-03-01 LAB
ALBUMIN SERPL-MCNC: 4.5 G/DL (ref 3.5–5.2)
ALBUMIN/GLOB SERPL: 1.7 G/DL
ALP SERPL-CCNC: 66 U/L (ref 39–117)
ALT SERPL W P-5'-P-CCNC: 23 U/L (ref 1–41)
ANION GAP SERPL CALCULATED.3IONS-SCNC: 10.3 MMOL/L (ref 5–15)
AST SERPL-CCNC: 23 U/L (ref 1–40)
BASOPHILS # BLD AUTO: 0.04 10*3/MM3 (ref 0–0.2)
BASOPHILS NFR BLD AUTO: 0.6 % (ref 0–1.5)
BILIRUB SERPL-MCNC: 0.6 MG/DL (ref 0–1.2)
BUN SERPL-MCNC: 17 MG/DL (ref 8–23)
BUN/CREAT SERPL: 17.3 (ref 7–25)
CALCIUM SPEC-SCNC: 9.1 MG/DL (ref 8.6–10.5)
CHLORIDE SERPL-SCNC: 104 MMOL/L (ref 98–107)
CHOLEST SERPL-MCNC: 136 MG/DL (ref 0–200)
CO2 SERPL-SCNC: 28.7 MMOL/L (ref 22–29)
CREAT SERPL-MCNC: 0.98 MG/DL (ref 0.76–1.27)
DEPRECATED RDW RBC AUTO: 42 FL (ref 37–54)
EOSINOPHIL # BLD AUTO: 0.13 10*3/MM3 (ref 0–0.4)
EOSINOPHIL NFR BLD AUTO: 2 % (ref 0.3–6.2)
ERYTHROCYTE [DISTWIDTH] IN BLOOD BY AUTOMATED COUNT: 12.2 % (ref 12.3–15.4)
GFR SERPL CREATININE-BSD FRML MDRD: 76 ML/MIN/1.73
GLOBULIN UR ELPH-MCNC: 2.7 GM/DL
GLUCOSE SERPL-MCNC: 89 MG/DL (ref 65–99)
HCT VFR BLD AUTO: 45.1 % (ref 37.5–51)
HDLC SERPL-MCNC: 60 MG/DL (ref 40–60)
HGB BLD-MCNC: 15.1 G/DL (ref 13–17.7)
IMM GRANULOCYTES # BLD AUTO: 0.01 10*3/MM3 (ref 0–0.05)
IMM GRANULOCYTES NFR BLD AUTO: 0.2 % (ref 0–0.5)
LDLC SERPL CALC-MCNC: 64 MG/DL (ref 0–100)
LDLC/HDLC SERPL: 1.08 {RATIO}
LYMPHOCYTES # BLD AUTO: 1.39 10*3/MM3 (ref 0.7–3.1)
LYMPHOCYTES NFR BLD AUTO: 21.2 % (ref 19.6–45.3)
MCH RBC QN AUTO: 31.9 PG (ref 26.6–33)
MCHC RBC AUTO-ENTMCNC: 33.5 G/DL (ref 31.5–35.7)
MCV RBC AUTO: 95.3 FL (ref 79–97)
MONOCYTES # BLD AUTO: 0.44 10*3/MM3 (ref 0.1–0.9)
MONOCYTES NFR BLD AUTO: 6.7 % (ref 5–12)
NEUTROPHILS NFR BLD AUTO: 4.55 10*3/MM3 (ref 1.7–7)
NEUTROPHILS NFR BLD AUTO: 69.3 % (ref 42.7–76)
NRBC BLD AUTO-RTO: 0 /100 WBC (ref 0–0.2)
PLATELET # BLD AUTO: 225 10*3/MM3 (ref 140–450)
PMV BLD AUTO: 10.5 FL (ref 6–12)
POTASSIUM SERPL-SCNC: 4.5 MMOL/L (ref 3.5–5.2)
PROT SERPL-MCNC: 7.2 G/DL (ref 6–8.5)
PSA SERPL-MCNC: 0.74 NG/ML (ref 0–4)
RBC # BLD AUTO: 4.73 10*6/MM3 (ref 4.14–5.8)
SODIUM SERPL-SCNC: 143 MMOL/L (ref 136–145)
TRIGL SERPL-MCNC: 57 MG/DL (ref 0–150)
VLDLC SERPL-MCNC: 12 MG/DL (ref 5–40)
WBC # BLD AUTO: 6.56 10*3/MM3 (ref 3.4–10.8)

## 2021-03-01 PROCEDURE — 85025 COMPLETE CBC W/AUTO DIFF WBC: CPT

## 2021-03-01 PROCEDURE — G0103 PSA SCREENING: HCPCS

## 2021-03-01 PROCEDURE — 80061 LIPID PANEL: CPT

## 2021-03-01 PROCEDURE — 80053 COMPREHEN METABOLIC PANEL: CPT

## 2021-03-03 ENCOUNTER — OFFICE VISIT (OUTPATIENT)
Dept: FAMILY MEDICINE CLINIC | Facility: CLINIC | Age: 71
End: 2021-03-03

## 2021-03-03 VITALS
OXYGEN SATURATION: 97 % | WEIGHT: 171.2 LBS | DIASTOLIC BLOOD PRESSURE: 80 MMHG | HEART RATE: 64 BPM | HEIGHT: 72 IN | BODY MASS INDEX: 23.19 KG/M2 | SYSTOLIC BLOOD PRESSURE: 126 MMHG

## 2021-03-03 DIAGNOSIS — I05.1 RHEUMATIC MITRAL REGURGITATION: Chronic | ICD-10-CM

## 2021-03-03 DIAGNOSIS — Z86.79 HISTORY OF RHEUMATIC FEVER: Chronic | ICD-10-CM

## 2021-03-03 DIAGNOSIS — E78.00 PURE HYPERCHOLESTEROLEMIA: ICD-10-CM

## 2021-03-03 DIAGNOSIS — Z00.00 WELL ADULT EXAM: Primary | ICD-10-CM

## 2021-03-03 PROCEDURE — G0439 PPPS, SUBSEQ VISIT: HCPCS | Performed by: FAMILY MEDICINE

## 2021-03-03 RX ORDER — ATORVASTATIN CALCIUM 20 MG/1
20 TABLET, FILM COATED ORAL NIGHTLY
Qty: 90 TABLET | Refills: 3 | Status: SHIPPED | OUTPATIENT
Start: 2021-03-03 | End: 2022-02-28

## 2021-03-03 RX ORDER — MULTIPLE VITAMINS W/ MINERALS TAB 9MG-400MCG
1 TAB ORAL DAILY
COMMUNITY

## 2021-03-03 RX ORDER — SODIUM PHOSPHATE,MONO-DIBASIC 19G-7G/118
ENEMA (ML) RECTAL 2 TIMES DAILY
COMMUNITY

## 2021-03-03 NOTE — PATIENT INSTRUCTIONS
Medicare Wellness  Personal Prevention Plan of Service     Date of Office Visit:  2021  Encounter Provider:  Monica Alvarado MD  Place of Service:  [facility]  Patient Name: Claude Lucas  :  1950    As part of the Medicare Wellness portion of your visit today, we are providing you with this personalized preventive plan of services (PPPS). This plan is based upon recommendations of the [place] Preventive Services Task Force (USPSTF) and the Advisory Committee on Immunization Practices (ACIP).    This lists the preventive care services that should be considered, and provides dates of when you are due. Items listed as completed are up-to-date and do not require any further intervention.    Health Maintenance   Topic Date Due   • LIPID PANEL  2022   • ANNUAL WELLNESS VISIT  2022   • COLONOSCOPY  2023   • TDAP/TD VACCINES (3 - Td) 2030   • HEPATITIS C SCREENING  Completed   • INFLUENZA VACCINE  Completed   • Pneumococcal Vaccine 65+  Completed   • ZOSTER VACCINE  Completed   • MENINGOCOCCAL VACCINE  Aged Out       No orders of the defined types were placed in this encounter.      Return in about 1 year (around 3/3/2022) for Medicare Wellness.

## 2021-03-03 NOTE — PROGRESS NOTES
The ABCs of the Annual Wellness Visit  Subsequent Medicare Wellness Visit    Chief Complaint   Patient presents with   • Medicare Wellness-subsequent       Subjective   History of Present Illness:  Claude Lucas is a 70 y.o. male who presents for a Subsequent Medicare Wellness Visit.    He is scheduled for COVID vaccine.     He uses [employer] morning year round.     He hasn't had problems with memory again since transient global amnesia.     He takes amoxicillin prior to dental work due to rheumatic heart disease. FH brother endocarditis. Dentist premedicates.     He has seen dermatology for mole patrol. He had excision for left neck as well as burned off 2 precancerous lesions.         HEALTH RISK ASSESSMENT    Recent Hospitalizations:  No hospitalization(s) within the last year.      Current Medical Providers:  Patient Care Team:  Monica Storey MD as PCP - General (Family Medicine)  Chandrakant Carr MD as Consulting Physician (Gastroenterology)  Barbra Foreman MD as Consulting Physician (Dermatology)    Smoking Status:  Social History     Tobacco Use   Smoking Status Never Smoker   Smokeless Tobacco Never Used       Alcohol Consumption:  Social History     Substance and Sexual Activity   Alcohol Use Yes    Comment: occasional       Depression Screen:   PHQ-2/PHQ-9 Depression Screening 3/3/2021   Little interest or pleasure in doing things 0   Feeling down, depressed, or hopeless 0   Total Score 0       Fall Risk Screen:  CYNTHIA Fall Risk Assessment was completed, and patient is at LOW risk for falls.Assessment completed on:3/3/2021   CYNTHIA Fall Risk Clinician Key Questions   Have you fallen in the past year?: No  Do you feel unsteady with walking?: No  Are you worried about falling?: No    Stay Idependant Patient Questions   Have you been advised to use a cane or a walker to get around safely?: No  Do you steady yourself by holding onto furniture when walking at home?: No  Do you  need to push with your hands to stand up from a chair?: No  Do you have trouble stepping up onto a curb?: No  Do you have to rush to the toilet?: No  Have you lost some feeling in your feet?: No  Do you take medicine that sometimes makes you feel light headed or more tired than usual?: No  Do you take medicine to help you sleep or improve your mood?: No  Do you often feel sad or depressed?: No  Patient Fall Risk Assessment Score : 0        Health Habits and Functional and Cognitive Screening:  Functional & Cognitive Status 3/3/2021   Do you have difficulty preparing food and eating? No   Do you have difficulty bathing yourself, getting dressed or grooming yourself? No   Do you have difficulty using the toilet? No   Do you have difficulty moving around from place to place? No   Do you have trouble with steps or getting out of a bed or a chair? No   Current Diet Well Balanced Diet   Dental Exam Up to date   Eye Exam Up to date   Exercise (times per week) 2 times per week   Current Exercise Activities Include Bicycling Outdoors   Do you need help using the phone?  No   Are you deaf or do you have serious difficulty hearing?  No   Do you need help with transportation? No   Do you need help shopping? No   Do you need help preparing meals?  No   Do you need help with housework?  No   Do you need help with laundry? No   Do you need help taking your medications? No   Do you need help managing money? No   Do you ever drive or ride in a car without wearing a seat belt? No   Have you felt unusual stress, anger or loneliness in the last month? No   Who do you live with? Spouse   If you need help, do you have trouble finding someone available to you? No   Have you been bothered in the last four weeks by sexual problems? No   Do you have difficulty concentrating, remembering or making decisions? No         Does the patient have evidence of cognitive impairment? No    Asprin use counseling:Does not need ASA (and currently is not  on it)        Age-appropriate Screening Schedule:  Refer to the list below for future screening recommendations based on patient's age, sex and/or medical conditions. Orders for these recommended tests are listed in the plan section. The patient has been provided with a written plan.    Health Maintenance   Topic Date Due   • LIPID PANEL  03/01/2022   • COLONOSCOPY  03/29/2023   • TDAP/TD VACCINES (3 - Td) 02/28/2030   • INFLUENZA VACCINE  Completed   • ZOSTER VACCINE  Completed          The following portions of the patient's history were reviewed and updated as appropriate: allergies, current medications, past family history, past medical history, past social history, past surgical history and problem list.    Outpatient Medications Prior to Visit   Medication Sig Dispense Refill   • fexofenadine (ALLEGRA) 180 MG tablet Take 180 mg by mouth Daily.     • fluticasone (FLONASE) 50 MCG/ACT nasal spray 2 sprays into each nostril As Needed for Rhinitis.     • glucosamine-chondroitin 500-400 MG capsule capsule Take  by mouth 2 (two) times a day.     • multivitamin with minerals tablet tablet Take 1 tablet by mouth Daily.     • atorvastatin (LIPITOR) 20 MG tablet Take 1 tablet by mouth Every Night. 90 tablet 3     No facility-administered medications prior to visit.        Patient Active Problem List   Diagnosis   • Rheumatic mitral regurgitation   • Benign prostatic hyperplasia with nocturia   • Diverticulosis of large intestine without hemorrhage   • Renal stones   • Gastroesophageal reflux disease   • Schatzki's ring   • History of rheumatic fever   • Pure hypercholesterolemia   • MVP (mitral valve prolapse)   • FH: colon polyps       Advanced Care Planning:  ACP discussion was held with the patient during this visit. Patient has an advance directive in EMR which is still valid.     Review of Systems    Compared to one year ago, the patient feels his physical health is worse. He feels a year older. Not quite as much  "energy, more aches and pains. 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   Compared to one year ago, the patient feels his mental health is the same.    Reviewed chart for potential of high risk medication in the elderly: not applicable  Reviewed chart for potential of harmful drug interactions in the elderly:not applicable    Objective         Vitals:    03/03/21 0902   BP: 126/80   BP Location: Left arm   Patient Position: Sitting   Cuff Size: Adult   Pulse: 64   SpO2: 97%   Weight: 77.7 kg (171 lb 3.2 oz)   Height: 182.9 cm (72\")   PainSc: 0-No pain       Body mass index is 23.22 kg/m².  Discussed the patient's BMI with him. The BMI is in the acceptable range.    Physical Exam  Constitutional:       General: He is not in acute distress.  HENT:      Right Ear: Tympanic membrane and ear canal normal.      Left Ear: Tympanic membrane and ear canal normal.   Eyes:      General:         Right eye: No discharge.         Left eye: No discharge.      Conjunctiva/sclera: Conjunctivae normal.   Neck:      Musculoskeletal: Neck supple.      Thyroid: No thyromegaly.   Cardiovascular:      Rate and Rhythm: Normal rate and regular rhythm.      Heart sounds: Murmur present. Systolic murmur present with a grade of 3/6.   Pulmonary:      Effort: Pulmonary effort is normal.      Breath sounds: Normal breath sounds.   Abdominal:      General: Bowel sounds are normal.      Palpations: Abdomen is soft.      Tenderness: There is no abdominal tenderness.   Genitourinary:     Prostate: Enlarged ( mild). Not tender and no nodules present.      Rectum: No mass, anal fissure, external hemorrhoid or internal hemorrhoid. Normal anal tone.   Lymphadenopathy:      Head:      Right side of head: No submandibular, preauricular or posterior auricular adenopathy.      Left side of head: No submandibular, preauricular or posterior auricular adenopathy.      Cervical: No cervical adenopathy.   Skin:     General: Skin is warm and dry.   Neurological:      Mental Status: " He is alert and oriented to person, place, and time.   Psychiatric:         Mood and Affect: Mood normal.         Behavior: Behavior normal.         Thought Content: Thought content normal.         Judgment: Judgment normal.         Lab Results   Component Value Date    TRIG 57 03/01/2021    HDL 60 03/01/2021    LDL 64 03/01/2021    VLDL 12 03/01/2021      Results for orders placed or performed in visit on 03/01/21   Comprehensive Metabolic Panel    Specimen: Blood   Result Value Ref Range    Glucose 89 65 - 99 mg/dL    BUN 17 8 - 23 mg/dL    Creatinine 0.98 0.76 - 1.27 mg/dL    Sodium 143 136 - 145 mmol/L    Potassium 4.5 3.5 - 5.2 mmol/L    Chloride 104 98 - 107 mmol/L    CO2 28.7 22.0 - 29.0 mmol/L    Calcium 9.1 8.6 - 10.5 mg/dL    Total Protein 7.2 6.0 - 8.5 g/dL    Albumin 4.50 3.50 - 5.20 g/dL    ALT (SGPT) 23 1 - 41 U/L    AST (SGOT) 23 1 - 40 U/L    Alkaline Phosphatase 66 39 - 117 U/L    Total Bilirubin 0.6 0.0 - 1.2 mg/dL    eGFR Non African Amer 76 >60 mL/min/1.73    Globulin 2.7 gm/dL    A/G Ratio 1.7 g/dL    BUN/Creatinine Ratio 17.3 7.0 - 25.0    Anion Gap 10.3 5.0 - 15.0 mmol/L   Lipid Panel    Specimen: Blood   Result Value Ref Range    Total Cholesterol 136 0 - 200 mg/dL    Triglycerides 57 0 - 150 mg/dL    HDL Cholesterol 60 40 - 60 mg/dL    LDL Cholesterol  64 0 - 100 mg/dL    VLDL Cholesterol 12 5 - 40 mg/dL    LDL/HDL Ratio 1.08    PSA Screen    Specimen: Blood   Result Value Ref Range    PSA 0.736 0.000 - 4.000 ng/mL   CBC Auto Differential    Specimen: Blood   Result Value Ref Range    WBC 6.56 3.40 - 10.80 10*3/mm3    RBC 4.73 4.14 - 5.80 10*6/mm3    Hemoglobin 15.1 13.0 - 17.7 g/dL    Hematocrit 45.1 37.5 - 51.0 %    MCV 95.3 79.0 - 97.0 fL    MCH 31.9 26.6 - 33.0 pg    MCHC 33.5 31.5 - 35.7 g/dL    RDW 12.2 (L) 12.3 - 15.4 %    RDW-SD 42.0 37.0 - 54.0 fl    MPV 10.5 6.0 - 12.0 fL    Platelets 225 140 - 450 10*3/mm3    Neutrophil % 69.3 42.7 - 76.0 %    Lymphocyte % 21.2 19.6 - 45.3 %     Monocyte % 6.7 5.0 - 12.0 %    Eosinophil % 2.0 0.3 - 6.2 %    Basophil % 0.6 0.0 - 1.5 %    Immature Grans % 0.2 0.0 - 0.5 %    Neutrophils, Absolute 4.55 1.70 - 7.00 10*3/mm3    Lymphocytes, Absolute 1.39 0.70 - 3.10 10*3/mm3    Monocytes, Absolute 0.44 0.10 - 0.90 10*3/mm3    Eosinophils, Absolute 0.13 0.00 - 0.40 10*3/mm3    Basophils, Absolute 0.04 0.00 - 0.20 10*3/mm3    Immature Grans, Absolute 0.01 0.00 - 0.05 10*3/mm3    nRBC 0.0 0.0 - 0.2 /100 WBC         Assessment/Plan   Medicare Risks and Personalized Health Plan  CMS Preventative Services Quick Reference  Advance Directive Discussion  Diabetic Lab Screening   Immunizations Discussed/Encouraged (specific immunizations; COVID-19 )  Prostate Cancer Screening      Counseled on health maintenance topics and preventative care recommendations: supplements      The above risks/problems have been discussed with the patient.  Pertinent information has been shared with the patient in the After Visit Summary.  Follow up plans and orders are seen below in the Assessment/Plan Section.    Diagnoses and all orders for this visit:    1. Well adult exam (Primary)  Reviewed previsit labs with patient in detail.  2. Pure hypercholesterolemia  -     atorvastatin (LIPITOR) 20 MG tablet; Take 1 tablet by mouth Every Night.  Dispense: 90 tablet; Refill: 3  LDL goal less than 100.  Continue atorvastatin.  3. History of rheumatic fever  Discussed with patient use of antibiotics prior to dental procedures however his dentist prescribed some amoxicillin anytime he has dental cleanings as well.  4. Rheumatic mitral regurgitation  Discussed with patient use of antibiotics prior to dental procedures however his dentist prescribed some amoxicillin anytime he has dental cleanings as well.    Follow Up:  Return in about 1 year (around 3/3/2022) for Medicare Wellness.     An After Visit Summary and PPPS were given to the patient.       Electronically signed by Monica Alvarado  MD, 03/03/21, 12:51 PM EST.

## 2022-02-26 DIAGNOSIS — E78.00 PURE HYPERCHOLESTEROLEMIA: ICD-10-CM

## 2022-02-28 RX ORDER — ATORVASTATIN CALCIUM 20 MG/1
TABLET, FILM COATED ORAL
Qty: 90 TABLET | Refills: 3 | Status: SHIPPED | OUTPATIENT
Start: 2022-02-28 | End: 2023-02-27

## 2022-02-28 NOTE — TELEPHONE ENCOUNTER
Rx Refill Note  Requested Prescriptions     Pending Prescriptions Disp Refills   • atorvastatin (LIPITOR) 20 MG tablet [Pharmacy Med Name: ATORVASTATIN 20 MG TABLET] 90 tablet 3     Sig: TAKE ONE TABLET BY MOUTH EVERY EVENING      Last office visit with prescribing clinician: 3/3/2021      Next office visit with prescribing clinician: 3/10/2022            Wen Rahman MA  02/28/22, 09:07 EST

## 2022-03-01 ENCOUNTER — PATIENT MESSAGE (OUTPATIENT)
Dept: FAMILY MEDICINE CLINIC | Facility: CLINIC | Age: 72
End: 2022-03-01

## 2022-03-01 DIAGNOSIS — Z12.5 SCREENING FOR MALIGNANT NEOPLASM OF PROSTATE: ICD-10-CM

## 2022-03-01 DIAGNOSIS — Z13.1 SCREENING FOR DIABETES MELLITUS: ICD-10-CM

## 2022-03-01 DIAGNOSIS — E78.00 PURE HYPERCHOLESTEROLEMIA: Primary | ICD-10-CM

## 2022-03-01 DIAGNOSIS — Z13.0 SCREENING FOR DEFICIENCY ANEMIA: ICD-10-CM

## 2022-03-04 ENCOUNTER — LAB (OUTPATIENT)
Dept: LAB | Facility: HOSPITAL | Age: 72
End: 2022-03-04

## 2022-03-04 DIAGNOSIS — Z13.0 SCREENING FOR DEFICIENCY ANEMIA: ICD-10-CM

## 2022-03-04 DIAGNOSIS — E78.00 PURE HYPERCHOLESTEROLEMIA: ICD-10-CM

## 2022-03-04 DIAGNOSIS — Z12.5 SCREENING FOR MALIGNANT NEOPLASM OF PROSTATE: ICD-10-CM

## 2022-03-04 DIAGNOSIS — Z13.1 SCREENING FOR DIABETES MELLITUS: ICD-10-CM

## 2022-03-04 LAB
ALBUMIN SERPL-MCNC: 4.4 G/DL (ref 3.5–5.2)
ALBUMIN/GLOB SERPL: 1.8 G/DL
ALP SERPL-CCNC: 75 U/L (ref 39–117)
ALT SERPL W P-5'-P-CCNC: 25 U/L (ref 1–41)
ANION GAP SERPL CALCULATED.3IONS-SCNC: 10.7 MMOL/L (ref 5–15)
AST SERPL-CCNC: 24 U/L (ref 1–40)
BILIRUB SERPL-MCNC: 0.5 MG/DL (ref 0–1.2)
BUN SERPL-MCNC: 16 MG/DL (ref 8–23)
BUN/CREAT SERPL: 16.8 (ref 7–25)
CALCIUM SPEC-SCNC: 9.5 MG/DL (ref 8.6–10.5)
CHLORIDE SERPL-SCNC: 104 MMOL/L (ref 98–107)
CHOLEST SERPL-MCNC: 113 MG/DL (ref 0–200)
CO2 SERPL-SCNC: 28.3 MMOL/L (ref 22–29)
CREAT SERPL-MCNC: 0.95 MG/DL (ref 0.76–1.27)
DEPRECATED RDW RBC AUTO: 42.8 FL (ref 37–54)
EGFRCR SERPLBLD CKD-EPI 2021: 86.6 ML/MIN/1.73
ERYTHROCYTE [DISTWIDTH] IN BLOOD BY AUTOMATED COUNT: 12.1 % (ref 12.3–15.4)
GLOBULIN UR ELPH-MCNC: 2.5 GM/DL
GLUCOSE SERPL-MCNC: 83 MG/DL (ref 65–99)
HCT VFR BLD AUTO: 44.7 % (ref 37.5–51)
HDLC SERPL-MCNC: 55 MG/DL (ref 40–60)
HGB BLD-MCNC: 14.5 G/DL (ref 13–17.7)
LDLC SERPL CALC-MCNC: 47 MG/DL (ref 0–100)
LDLC/HDLC SERPL: 0.89 {RATIO}
MCH RBC QN AUTO: 31 PG (ref 26.6–33)
MCHC RBC AUTO-ENTMCNC: 32.4 G/DL (ref 31.5–35.7)
MCV RBC AUTO: 95.5 FL (ref 79–97)
PLATELET # BLD AUTO: 241 10*3/MM3 (ref 140–450)
PMV BLD AUTO: 10.5 FL (ref 6–12)
POTASSIUM SERPL-SCNC: 4 MMOL/L (ref 3.5–5.2)
PROT SERPL-MCNC: 6.9 G/DL (ref 6–8.5)
PSA SERPL-MCNC: 0.75 NG/ML (ref 0–4)
RBC # BLD AUTO: 4.68 10*6/MM3 (ref 4.14–5.8)
SODIUM SERPL-SCNC: 143 MMOL/L (ref 136–145)
TRIGL SERPL-MCNC: 44 MG/DL (ref 0–150)
VLDLC SERPL-MCNC: 11 MG/DL (ref 5–40)
WBC NRBC COR # BLD: 4.72 10*3/MM3 (ref 3.4–10.8)

## 2022-03-04 PROCEDURE — 85027 COMPLETE CBC AUTOMATED: CPT

## 2022-03-04 PROCEDURE — 80053 COMPREHEN METABOLIC PANEL: CPT

## 2022-03-04 PROCEDURE — G0103 PSA SCREENING: HCPCS

## 2022-03-04 PROCEDURE — 36415 COLL VENOUS BLD VENIPUNCTURE: CPT

## 2022-03-04 PROCEDURE — 80061 LIPID PANEL: CPT

## 2022-03-10 ENCOUNTER — OFFICE VISIT (OUTPATIENT)
Dept: FAMILY MEDICINE CLINIC | Facility: CLINIC | Age: 72
End: 2022-03-10

## 2022-03-10 ENCOUNTER — PRIOR AUTHORIZATION (OUTPATIENT)
Dept: FAMILY MEDICINE CLINIC | Facility: CLINIC | Age: 72
End: 2022-03-10

## 2022-03-10 VITALS
WEIGHT: 171.8 LBS | BODY MASS INDEX: 23.27 KG/M2 | HEIGHT: 72 IN | DIASTOLIC BLOOD PRESSURE: 64 MMHG | SYSTOLIC BLOOD PRESSURE: 108 MMHG | HEART RATE: 62 BPM | OXYGEN SATURATION: 95 %

## 2022-03-10 DIAGNOSIS — R41.3 MEMORY CHANGES: ICD-10-CM

## 2022-03-10 DIAGNOSIS — Z00.00 MEDICARE ANNUAL WELLNESS VISIT, SUBSEQUENT: Primary | ICD-10-CM

## 2022-03-10 DIAGNOSIS — E78.00 PURE HYPERCHOLESTEROLEMIA: ICD-10-CM

## 2022-03-10 DIAGNOSIS — G45.4 TRANSIENT GLOBAL AMNESIA: ICD-10-CM

## 2022-03-10 DIAGNOSIS — R35.1 BENIGN PROSTATIC HYPERPLASIA WITH NOCTURIA: ICD-10-CM

## 2022-03-10 DIAGNOSIS — N40.1 BENIGN PROSTATIC HYPERPLASIA WITH NOCTURIA: ICD-10-CM

## 2022-03-10 DIAGNOSIS — M18.10 OSTEOARTHRITIS OF THUMB, UNSPECIFIED LATERALITY: ICD-10-CM

## 2022-03-10 PROCEDURE — G0439 PPPS, SUBSEQ VISIT: HCPCS | Performed by: FAMILY MEDICINE

## 2022-03-10 PROCEDURE — 1170F FXNL STATUS ASSESSED: CPT | Performed by: FAMILY MEDICINE

## 2022-03-10 PROCEDURE — 1159F MED LIST DOCD IN RCRD: CPT | Performed by: FAMILY MEDICINE

## 2022-03-10 PROCEDURE — 1126F AMNT PAIN NOTED NONE PRSNT: CPT | Performed by: FAMILY MEDICINE

## 2022-03-10 NOTE — TELEPHONE ENCOUNTER
Completed Prior Authorization for Diclofenac     KEY: [id number]  Auth has been approved. Determination in chart for review

## 2022-03-10 NOTE — PATIENT INSTRUCTIONS
Medicare Wellness  Personal Prevention Plan of Service     Date of Office Visit:    Encounter Provider:  Monica Alvarado MD  Place of Service:  [facility]  Patient Name: Claude Lucas  :  1950    As part of the Medicare Wellness portion of your visit today, we are providing you with this personalized preventive plan of services (PPPS). This plan is based upon recommendations of the [place] Preventive Services Task Force (USPSTF) and the Advisory Committee on Immunization Practices (ACIP).    This lists the preventive care services that should be considered, and provides dates of when you are due. Items listed as completed are up-to-date and do not require any further intervention.    Health Maintenance   Topic Date Due   • ANNUAL WELLNESS VISIT  03/10/2022 (Originally 3/3/2022)   • LIPID PANEL  2023   • COLORECTAL CANCER SCREENING  2023   • TDAP/TD VACCINES (3 - Td or Tdap) 2030   • HEPATITIS C SCREENING  Completed   • COVID-19 Vaccine  Completed   • INFLUENZA VACCINE  Completed   • Pneumococcal Vaccine 65+  Completed   • ZOSTER VACCINE  Completed       No orders of the defined types were placed in this encounter.      No follow-ups on file.

## 2022-03-10 NOTE — PROGRESS NOTES
The ABCs of the Annual Wellness Visit  Subsequent Medicare Wellness Visit    Chief Complaint   Patient presents with   • Medicare Wellness-subsequent   • Annual Exam   • Arthritis   • Foot Pain   • Memory Loss     Pt states that he would like to get his memory checked. Pt states that he doesn't think he is having a lot of memory lost, but would just like to make sure he's doing okay.       Subjective    History of Present Illness:  Claude Lucas is a 71 y.o. male who presents for a Subsequent Medicare Wellness Visit.      Arthritis in his thumbs. Daughter has psoriatic arthritis at a young age.  Going up and down steps feel creaks.     Nocturia 2-3 times. Exercises he has less nocturia. No incomplete emptying. Dribbling sometimes. 3-4am he wakes up and goes to recliner. Goes to sleep easy at 10 pm.     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 No further episodes. Every time he takes a hot shower he thinks about the episode. He doesn't remember someone's name, wonders about other dementia.     Left heel pain intermittently. He uses [employer] shoes with improvement. Bump on side of left foot, saw dermatologist and told it was a corn. Hard and like a scab.     He has been cycling more with improvement in weather.     The following portions of the patient's history were reviewed and   updated as appropriate: allergies, current medications, past family history, past medical history, past social history, past surgical history and problem list.    Compared to one year ago, the patient feels his physical   health is the same.    Compared to one year ago, the patient feels his mental   health is the same.    Recent Hospitalizations:  He was not admitted to the hospital during the last year.       Current Medical Providers:  Patient Care Team:  Monica Storey MD as PCP - General (Family Medicine)  Chandrakant Carr MD as Consulting Physician (Gastroenterology)  Barbra Foreman MD as Consulting  "Physician (Dermatology)    Outpatient Medications Prior to Visit   Medication Sig Dispense Refill   • atorvastatin (LIPITOR) 20 MG tablet TAKE ONE TABLET BY MOUTH EVERY EVENING 90 tablet 3   • fexofenadine (ALLEGRA) 180 MG tablet Take 180 mg by mouth Daily. Pt states that he only takes this in the spring time.     • fluticasone (FLONASE) 50 MCG/ACT nasal spray 2 sprays into each nostril As Needed for Rhinitis.     • glucosamine-chondroitin 500-400 MG capsule capsule Take  by mouth 2 (two) times a day.     • multivitamin with minerals tablet tablet Take 1 tablet by mouth Daily.       No facility-administered medications prior to visit.       No opioid medication identified on active medication list. I have reviewed chart for other potential  high risk medication/s and harmful drug interactions in the elderly.          Aspirin is not on active medication list.  Aspirin use is not indicated based on review of current medical condition/s. Risk of harm outweighs potential benefits.  .    Patient Active Problem List   Diagnosis   • Rheumatic mitral regurgitation   • Benign prostatic hyperplasia with nocturia   • Diverticulosis of large intestine without hemorrhage   • Renal stones   • Gastroesophageal reflux disease   • Schatzki's ring   • History of rheumatic fever   • Pure hypercholesterolemia   • MVP (mitral valve prolapse)   • FH: colon polyps   • Memory changes     Advance Care Planning  Advance Directive is on file.  ACP discussion was held with the patient during this visit. Patient has an advance directive in EMR which is still valid.           Objective    Vitals:    03/10/22 0807   BP: 108/64   Pulse: 62   SpO2: 95%   Weight: 77.9 kg (171 lb 12.8 oz)   Height: 182.9 cm (72.01\")   PainSc: 0-No pain     BMI Readings from Last 1 Encounters:   03/10/22 23.30 kg/m²   BMI is within normal parameters. No follow-up required.  Body mass index is 23.3 kg/m².  BMI has not been calculated during today's encounter.     Does " the patient have evidence of cognitive impairment? He has concerns about his memory.     Physical Exam  Constitutional:       General: He is not in acute distress.  HENT:      Right Ear: Tympanic membrane and ear canal normal.      Left Ear: Tympanic membrane and ear canal normal.   Eyes:      General:         Right eye: No discharge.         Left eye: No discharge.      Conjunctiva/sclera: Conjunctivae normal.   Neck:      Thyroid: No thyromegaly.   Cardiovascular:      Rate and Rhythm: Normal rate and regular rhythm.   Pulmonary:      Effort: Pulmonary effort is normal.      Breath sounds: Normal breath sounds.   Abdominal:      Palpations: Abdomen is soft.      Tenderness: There is no abdominal tenderness.   Musculoskeletal:      Cervical back: Neck supple.      Comments: Bilateral hands w/o swelling or redness   Lymphadenopathy:      Head:      Right side of head: No submandibular, preauricular or posterior auricular adenopathy.      Left side of head: No submandibular, preauricular or posterior auricular adenopathy.      Cervical: No cervical adenopathy.   Skin:     General: Skin is warm.   Neurological:      Mental Status: He is alert and oriented to person, place, and time.   Psychiatric:         Mood and Affect: Mood normal.         Behavior: Behavior normal.         Thought Content: Thought content normal.         Judgment: Judgment normal.       Lab Results   Component Value Date    TRIG 44 03/04/2022    HDL 55 03/04/2022    LDL 47 03/04/2022    VLDL 11 03/04/2022       PSA Screen (03/04/2022 08:17)  Lipid Panel (03/04/2022 08:17)  Comprehensive Metabolic Panel (03/04/2022 08:17)  CBC (No Diff) (03/04/2022 08:17)        HEALTH RISK ASSESSMENT    Smoking Status:  Social History     Tobacco Use   Smoking Status Never Smoker   Smokeless Tobacco Never Used     Alcohol Consumption:  Social History     Substance and Sexual Activity   Alcohol Use Yes   • Alcohol/week: 7.0 standard drinks   • Types: 3 Glasses of  wine, 1 Shots of liquor, 3 Drinks containing 0.5 oz of alcohol per week    Comment: occasional     Fall Risk Screen:    CYNTHIA Fall Risk Assessment was completed, and patient is at LOW risk for falls.Assessment completed on:3/10/2022  CYNTHIA Fall Risk Clinician Key Questions   Have you fallen in the past year?: No  Do you feel unsteady with walking?: No  Are you worried about falling?: No    Stay Idependant Patient Questions   Have you been advised to use a cane or a walker to get around safely?: No  Do you steady yourself by holding onto furniture when walking at home?: No  Do you need to push with your hands to stand up from a chair?: No  Do you have trouble stepping up onto a curb?: No  Do you have to rush to the toilet?: No  Have you lost some feeling in your feet?: No  Do you take medicine that sometimes makes you feel light headed or more tired than usual?: No  Do you take medicine to help you sleep or improve your mood?: No  Do you often feel sad or depressed?: No  Patient Fall Risk Assessment Score : 0      Depression Screening:  PHQ-2/PHQ-9 Depression Screening 3/10/2022   Little Interest or Pleasure in Doing Things 0-->not at all   Feeling Down, Depressed or Hopeless 0-->not at all   PHQ-9: Brief Depression Severity Measure Score 0       Health Habits and Functional and Cognitive Screening:  Functional & Cognitive Status 3/10/2022   Do you have difficulty preparing food and eating? No   Do you have difficulty bathing yourself, getting dressed or grooming yourself? No   Do you have difficulty using the toilet? No   Do you have difficulty moving around from place to place? No   Do you have trouble with steps or getting out of a bed or a chair? No   Current Diet Well Balanced Diet   Dental Exam Up to date   Eye Exam Up to date   Exercise (times per week) 2 times per week   Current Exercises Include Stationary Bicycling/Spin Class   Current Exercise Activities Include -   Do you need help using the phone?  No    Are you deaf or do you have serious difficulty hearing?  No   Do you need help with transportation? No   Do you need help shopping? No   Do you need help preparing meals?  No   Do you need help with housework?  No   Do you need help with laundry? No   Do you need help taking your medications? No   Do you need help managing money? No   Do you ever drive or ride in a car without wearing a seat belt? No   Have you felt unusual stress, anger or loneliness in the last month? No   Who do you live with? Spouse   If you need help, do you have trouble finding someone available to you? No   Have you been bothered in the last four weeks by sexual problems? No   Do you have difficulty concentrating, remembering or making decisions? No       Age-appropriate Screening Schedule:  Refer to the list below for future screening recommendations based on patient's age, sex and/or medical conditions. Orders for these recommended tests are listed in the plan section. The patient has been provided with a written plan.    Health Maintenance   Topic Date Due   • LIPID PANEL  03/04/2023   • TDAP/TD VACCINES (3 - Td or Tdap) 02/28/2030   • INFLUENZA VACCINE  Completed   • ZOSTER VACCINE  Completed              Immunization History   Administered Date(s) Administered   • COVID-19 (PFIZER) PURPLE CAP 03/08/2021, 03/27/2021, 09/08/2021   • Fluad Quad 65+ 10/09/2020   • Fluzone High Dose =>65 Years ([employer] ONLY) 10/23/2017, 10/11/2018, 10/03/2019   • Fluzone High-Dose 65+yrs 09/24/2021   • Pneumococcal Conjugate 13-Valent (PCV13) 11/27/2017   • Pneumococcal Polysaccharide (PPSV23) 02/22/2019   • Shingrix 03/16/2019, 06/04/2019   • Tdap 01/01/2010, 02/28/2020   • Zostavax 01/01/2015       Assessment/Plan   CMS Preventative Services Quick Reference  Risk Factors Identified During Encounter  Chronic Pain   Dementia/Memory   The above risks/problems have been discussed with the patient.  Follow up actions/plans if indicated are seen below in the  Assessment/Plan Section.  Pertinent information has been shared with the patient in the After Visit Summary.    Diagnoses and all orders for this visit:    1. Medicare annual wellness visit, subsequent (Primary)  Reviewed previsit fasting labs with patient in detail.  Colon cancer screening up-to-date.  Prostate cancer screening up-to-date.    2. Pure hypercholesterolemia  Well-controlled with atorvastatin.  Recheck annually.  3. Transient global amnesia  History of transient global amnesia.  He is always concerned about a recurrence.  Discussed if he would like further evaluation with his memory I can place a referral to neurology but he deferred at this time.  4. Memory changes  History of transient global amnesia.  He is always concerned about a recurrence.  Discussed if he would like further evaluation with his memory I can place a referral to neurology but he deferred at this time.  5. Benign prostatic hyperplasia with nocturia  Mild symptoms.  6. Osteoarthritis of thumb, unspecified laterality  -     Diclofenac Sodium (VOLTAREN) 1 % gel gel; Apply 4 g topically to the appropriate area as directed 4 (Four) Times a Day As Needed (joint pain).  Dispense: 150 g; Refill: 3  New.  Recommend treatment with topical diclofenac.  If not covered by insurance can also purchase over-the-counter.      Follow Up:   Return in about 1 year (around 3/13/2023) for MWV and fasting labs.     An After Visit Summary and PPPS were made available to the patient.                 Electronically signed by Monica Alvarado MD, 03/10/22, 8:49 AM EST.

## 2022-05-17 ENCOUNTER — HOSPITAL ENCOUNTER (EMERGENCY)
Facility: HOSPITAL | Age: 72
Discharge: HOME | End: 2022-05-17
Attending: EMERGENCY MEDICINE | Admitting: EMERGENCY MEDICINE

## 2022-05-17 ENCOUNTER — APPOINTMENT (OUTPATIENT)
Dept: CT IMAGING | Facility: HOSPITAL | Age: 72
End: 2022-05-17

## 2022-05-17 VITALS
WEIGHT: 175 LBS | OXYGEN SATURATION: 94 % | DIASTOLIC BLOOD PRESSURE: 79 MMHG | HEIGHT: 72 IN | BODY MASS INDEX: 23.7 KG/M2 | RESPIRATION RATE: 20 BRPM | TEMPERATURE: 97.5 F | SYSTOLIC BLOOD PRESSURE: 151 MMHG | HEART RATE: 43 BPM

## 2022-05-17 DIAGNOSIS — N20.1 RIGHT URETERAL STONE: ICD-10-CM

## 2022-05-17 DIAGNOSIS — N23 RENAL COLIC ON RIGHT SIDE: Primary | ICD-10-CM

## 2022-05-17 LAB
ALBUMIN SERPL-MCNC: 4.1 G/DL (ref 3.5–5.2)
ALBUMIN/GLOB SERPL: 1.6 G/DL
ALP SERPL-CCNC: 83 U/L (ref 39–117)
ALT SERPL W P-5'-P-CCNC: 20 U/L (ref 1–41)
ANION GAP SERPL CALCULATED.3IONS-SCNC: 11 MMOL/L (ref 5–15)
AST SERPL-CCNC: 24 U/L (ref 1–40)
BASOPHILS # BLD AUTO: 0.05 10*3/MM3 (ref 0–0.2)
BASOPHILS NFR BLD AUTO: 0.9 % (ref 0–1.5)
BILIRUB SERPL-MCNC: 0.7 MG/DL (ref 0–1.2)
BILIRUB UR QL STRIP: NEGATIVE
BUN SERPL-MCNC: 17 MG/DL (ref 8–23)
BUN/CREAT SERPL: 17.5 (ref 7–25)
CALCIUM SPEC-SCNC: 9.1 MG/DL (ref 8.6–10.5)
CHLORIDE SERPL-SCNC: 103 MMOL/L (ref 98–107)
CLARITY UR: CLEAR
CO2 SERPL-SCNC: 25 MMOL/L (ref 22–29)
COLOR UR: YELLOW
CREAT SERPL-MCNC: 0.97 MG/DL (ref 0.76–1.27)
D-LACTATE SERPL-SCNC: 1.4 MMOL/L (ref 0.5–2)
DEPRECATED RDW RBC AUTO: 47.5 FL (ref 37–54)
EGFRCR SERPLBLD CKD-EPI 2021: 84.5 ML/MIN/1.73
EOSINOPHIL # BLD AUTO: 0.2 10*3/MM3 (ref 0–0.4)
EOSINOPHIL NFR BLD AUTO: 3.7 % (ref 0.3–6.2)
ERYTHROCYTE [DISTWIDTH] IN BLOOD BY AUTOMATED COUNT: 13.8 % (ref 12.3–15.4)
GLOBULIN UR ELPH-MCNC: 2.6 GM/DL
GLUCOSE SERPL-MCNC: 142 MG/DL (ref 65–99)
GLUCOSE UR STRIP-MCNC: NEGATIVE MG/DL
HCT VFR BLD AUTO: 41.6 % (ref 37.5–51)
HGB BLD-MCNC: 14 G/DL (ref 13–17.7)
HGB UR QL STRIP.AUTO: NEGATIVE
HOLD SPECIMEN: NORMAL
IMM GRANULOCYTES # BLD AUTO: 0.01 10*3/MM3 (ref 0–0.05)
IMM GRANULOCYTES NFR BLD AUTO: 0.2 % (ref 0–0.5)
KETONES UR QL STRIP: NEGATIVE
LEUKOCYTE ESTERASE UR QL STRIP.AUTO: NEGATIVE
LIPASE SERPL-CCNC: 14 U/L (ref 13–60)
LYMPHOCYTES # BLD AUTO: 1.31 10*3/MM3 (ref 0.7–3.1)
LYMPHOCYTES NFR BLD AUTO: 24.2 % (ref 19.6–45.3)
MCH RBC QN AUTO: 31.1 PG (ref 26.6–33)
MCHC RBC AUTO-ENTMCNC: 33.7 G/DL (ref 31.5–35.7)
MCV RBC AUTO: 92.4 FL (ref 79–97)
MONOCYTES # BLD AUTO: 0.48 10*3/MM3 (ref 0.1–0.9)
MONOCYTES NFR BLD AUTO: 8.9 % (ref 5–12)
NEUTROPHILS NFR BLD AUTO: 3.36 10*3/MM3 (ref 1.7–7)
NEUTROPHILS NFR BLD AUTO: 62.1 % (ref 42.7–76)
NITRITE UR QL STRIP: NEGATIVE
NRBC BLD AUTO-RTO: 0 /100 WBC (ref 0–0.2)
PH UR STRIP.AUTO: <=5 [PH] (ref 5–8)
PLATELET # BLD AUTO: 220 10*3/MM3 (ref 140–450)
PMV BLD AUTO: 9.9 FL (ref 6–12)
POTASSIUM SERPL-SCNC: 4.2 MMOL/L (ref 3.5–5.2)
PROT SERPL-MCNC: 6.7 G/DL (ref 6–8.5)
PROT UR QL STRIP: NEGATIVE
RBC # BLD AUTO: 4.5 10*6/MM3 (ref 4.14–5.8)
SODIUM SERPL-SCNC: 139 MMOL/L (ref 136–145)
SP GR UR STRIP: 1.02 (ref 1–1.03)
UROBILINOGEN UR QL STRIP: NORMAL
WBC NRBC COR # BLD: 5.41 10*3/MM3 (ref 3.4–10.8)
WHOLE BLOOD HOLD COAG: NORMAL
WHOLE BLOOD HOLD SPECIMEN: NORMAL

## 2022-05-17 PROCEDURE — 80053 COMPREHEN METABOLIC PANEL: CPT | Performed by: EMERGENCY MEDICINE

## 2022-05-17 PROCEDURE — 36415 COLL VENOUS BLD VENIPUNCTURE: CPT

## 2022-05-17 PROCEDURE — 74176 CT ABD & PELVIS W/O CONTRAST: CPT

## 2022-05-17 PROCEDURE — 83605 ASSAY OF LACTIC ACID: CPT | Performed by: EMERGENCY MEDICINE

## 2022-05-17 PROCEDURE — 81003 URINALYSIS AUTO W/O SCOPE: CPT | Performed by: EMERGENCY MEDICINE

## 2022-05-17 PROCEDURE — 96374 THER/PROPH/DIAG INJ IV PUSH: CPT

## 2022-05-17 PROCEDURE — 99283 EMERGENCY DEPT VISIT LOW MDM: CPT

## 2022-05-17 PROCEDURE — 25010000002 KETOROLAC TROMETHAMINE PER 15 MG: Performed by: PHYSICIAN ASSISTANT

## 2022-05-17 PROCEDURE — 25010000002 ONDANSETRON PER 1 MG: Performed by: PHYSICIAN ASSISTANT

## 2022-05-17 PROCEDURE — 85025 COMPLETE CBC W/AUTO DIFF WBC: CPT | Performed by: EMERGENCY MEDICINE

## 2022-05-17 PROCEDURE — 96375 TX/PRO/DX INJ NEW DRUG ADDON: CPT

## 2022-05-17 PROCEDURE — 25010000002 MORPHINE PER 10 MG: Performed by: EMERGENCY MEDICINE

## 2022-05-17 PROCEDURE — 83690 ASSAY OF LIPASE: CPT | Performed by: EMERGENCY MEDICINE

## 2022-05-17 RX ORDER — KETOROLAC TROMETHAMINE 10 MG/1
10 TABLET, FILM COATED ORAL EVERY 6 HOURS PRN
Qty: 12 TABLET | Refills: 0 | Status: SHIPPED | OUTPATIENT
Start: 2022-05-17 | End: 2023-03-13

## 2022-05-17 RX ORDER — MORPHINE SULFATE 4 MG/ML
4 INJECTION, SOLUTION INTRAMUSCULAR; INTRAVENOUS ONCE
Status: COMPLETED | OUTPATIENT
Start: 2022-05-17 | End: 2022-05-17

## 2022-05-17 RX ORDER — SODIUM CHLORIDE 9 MG/ML
10 INJECTION INTRAVENOUS AS NEEDED
Status: DISCONTINUED | OUTPATIENT
Start: 2022-05-17 | End: 2022-05-17 | Stop reason: HOSPADM

## 2022-05-17 RX ORDER — ONDANSETRON 2 MG/ML
4 INJECTION INTRAMUSCULAR; INTRAVENOUS ONCE
Status: COMPLETED | OUTPATIENT
Start: 2022-05-17 | End: 2022-05-17

## 2022-05-17 RX ORDER — KETOROLAC TROMETHAMINE 30 MG/ML
15 INJECTION, SOLUTION INTRAMUSCULAR; INTRAVENOUS ONCE
Status: COMPLETED | OUTPATIENT
Start: 2022-05-17 | End: 2022-05-17

## 2022-05-17 RX ORDER — TAMSULOSIN HYDROCHLORIDE 0.4 MG/1
1 CAPSULE ORAL NIGHTLY
Qty: 30 CAPSULE | Refills: 0 | Status: SHIPPED | OUTPATIENT
Start: 2022-05-17 | End: 2023-03-13

## 2022-05-17 RX ORDER — HYDROCODONE BITARTRATE AND ACETAMINOPHEN 5; 325 MG/1; MG/1
1 TABLET ORAL EVERY 6 HOURS PRN
Qty: 12 TABLET | Refills: 0 | Status: SHIPPED | OUTPATIENT
Start: 2022-05-17 | End: 2023-03-13

## 2022-05-17 RX ADMIN — MORPHINE SULFATE 4 MG: 4 INJECTION, SOLUTION INTRAMUSCULAR; INTRAVENOUS at 09:02

## 2022-05-17 RX ADMIN — KETOROLAC TROMETHAMINE 15 MG: 30 INJECTION, SOLUTION INTRAMUSCULAR; INTRAVENOUS at 08:41

## 2022-05-17 RX ADMIN — ONDANSETRON 4 MG: 2 INJECTION INTRAMUSCULAR; INTRAVENOUS at 08:41

## 2022-05-17 RX ADMIN — SODIUM CHLORIDE 1000 ML: 9 INJECTION, SOLUTION INTRAVENOUS at 08:42

## 2022-05-17 RX ADMIN — LIDOCAINE HYDROCHLORIDE 125 MG: 10 INJECTION, SOLUTION EPIDURAL; INFILTRATION; INTRACAUDAL; PERINEURAL at 09:59

## 2022-05-17 NOTE — ED PROVIDER NOTES
Subjective   71-year-old male presents emergency department today with right flank pain.  Patient reports he had about 5 or 6 kidney stones in the past he is pretty in tune to his symptoms and began having pain about an hour prior to arrival here.  Patient reports pain radiates Levette down into the groin.  He has had no hematuria dysuria or frequency.  He has been able to pass his previous stones without any surgical intervention.  He has had no fevers no chills.  Had some nausea but no vomiting up to this point.  Nothing seems to exacerbate or alleviate his discomfort.  Said no previous abdominal surgeries.      History provided by:  Patient and spouse   used: No    Flank Pain  Pain location:  R flank  Pain quality: aching and dull    Pain radiates to:  Groin  Pain severity:  Moderate  Onset quality:  Sudden  Duration:  1 hour  Timing:  Constant  Progression:  Waxing and waning  Chronicity:  Recurrent  Context comment:  History of kidney stones this is similar.  Relieved by:  Nothing  Worsened by:  Nothing  Ineffective treatments:  None tried  Associated symptoms: nausea    Associated symptoms: no anorexia, no chest pain, no chills, no diarrhea, no dysuria, no fever, no hematemesis, no hematochezia, no hematuria, no melena, no shortness of breath, no sore throat and no vomiting    Risk factors: not elderly, no NSAID use and not obese        Review of Systems   Constitutional: Negative for chills, diaphoresis and fever.   HENT: Negative for sore throat.    Respiratory: Negative for chest tightness, shortness of breath and wheezing.    Cardiovascular: Negative for chest pain and palpitations.   Gastrointestinal: Positive for nausea. Negative for abdominal pain, anorexia, blood in stool, diarrhea, hematemesis, hematochezia, melena and vomiting.   Genitourinary: Positive for flank pain. Negative for dysuria, hematuria, scrotal swelling and urgency.   Musculoskeletal: Negative for back pain and neck  pain.   Skin: Negative for pallor and rash.   Hematological: Negative for adenopathy.   Psychiatric/Behavioral: Negative.    All other systems reviewed and are negative.      Past Medical History:   Diagnosis Date   • Allergic     seasonal   • Benign prostatic hyperplasia    • Diverticulosis    • GERD (gastroesophageal reflux disease)     with schatzkis ring status post esophageal dilation   • Headache    • History of snoring     with resolution using bi guard   • Hyperlipidemia    • Kidney stones    • Mitral valve prolapse    • Rheumatic fever    • RLS (restless legs syndrome)    • Transient global amnesia 2020   • Vitreous disorder     seperation with residual floaters       No Known Allergies    Past Surgical History:   Procedure Laterality Date   • SKIN BIOPSY      4 SKs and AKs    • WISDOM TOOTH EXTRACTION         Family History   Problem Relation Age of Onset   • Breast cancer Mother 39   • Heart disease Father    • Hypertension Father    • Diabetes type I Father    • Colon polyps Father    • Mitral valve prolapse Brother    • Other Brother          of cardiac arrest 4/3/2020   • Psoriasis Daughter    • Arthritis Daughter         some in my thumb joints as well   • Psoriasis Cousin    • Arthritis Cousin    • Colon cancer Neg Hx    • Lung cancer Neg Hx        Social History     Socioeconomic History   • Marital status:      Spouse name: Lisa   Tobacco Use   • Smoking status: Never Smoker   • Smokeless tobacco: Never Used   Substance and Sexual Activity   • Alcohol use: Yes     Alcohol/week: 7.0 standard drinks     Types: 3 Glasses of wine, 1 Shots of liquor, 3 Drinks containing 0.5 oz of alcohol per week     Comment: occasional   • Drug use: No   • Sexual activity: Never           Objective   Physical Exam  Vitals and nursing note reviewed.   Constitutional:       Appearance: He is well-developed.   HENT:      Head: Normocephalic and atraumatic.      Right Ear: External ear normal.      Left  Ear: External ear normal.      Nose: Nose normal.   Eyes:      General: No scleral icterus.     Conjunctiva/sclera: Conjunctivae normal.      Pupils: Pupils are equal, round, and reactive to light.   Neck:      Thyroid: No thyromegaly.   Cardiovascular:      Rate and Rhythm: Normal rate and regular rhythm.      Heart sounds: Normal heart sounds.   Pulmonary:      Effort: Pulmonary effort is normal. No respiratory distress.      Breath sounds: Normal breath sounds. No wheezing or rales.   Chest:      Chest wall: No tenderness.   Abdominal:      General: Bowel sounds are normal. There is no distension.      Palpations: Abdomen is soft.      Tenderness: There is no abdominal tenderness.   Musculoskeletal:         General: Normal range of motion.      Cervical back: Normal range of motion.   Lymphadenopathy:      Cervical: No cervical adenopathy.   Skin:     General: Skin is warm and dry.   Neurological:      Mental Status: He is alert and oriented to person, place, and time.      Cranial Nerves: No cranial nerve deficit.      Coordination: Coordination normal.      Deep Tendon Reflexes: Reflexes are normal and symmetric. Reflexes normal.   Psychiatric:         Behavior: Behavior normal.         Thought Content: Thought content normal.         Judgment: Judgment normal.         Procedures           ED Course  ED Course as of 05/17/22 1032   Tue May 17, 2022   1029 He gets significant pain relief with IV Toradol and 1 dose of IV morphine.  He will follow-up with Dr. Ball.  Strain urine. [CRISTINA]      ED Course User Index  [CRISTINA] Rafa Rodriguez PA KASPER reviewed by Bobby Reed MD     Recent Results (from the past 24 hour(s))   Comprehensive Metabolic Panel    Collection Time: 05/17/22  8:02 AM    Specimen: Blood   Result Value Ref Range    Glucose 142 (H) 65 - 99 mg/dL    BUN 17 8 - 23 mg/dL    Creatinine 0.97 0.76 - 1.27 mg/dL    Sodium 139 136 - 145 mmol/L    Potassium 4.2  3.5 - 5.2 mmol/L    Chloride 103 98 - 107 mmol/L    CO2 25.0 22.0 - 29.0 mmol/L    Calcium 9.1 8.6 - 10.5 mg/dL    Total Protein 6.7 6.0 - 8.5 g/dL    Albumin 4.10 3.50 - 5.20 g/dL    ALT (SGPT) 20 1 - 41 U/L    AST (SGOT) 24 1 - 40 U/L    Alkaline Phosphatase 83 39 - 117 U/L    Total Bilirubin 0.7 0.0 - 1.2 mg/dL    Globulin 2.6 gm/dL    A/G Ratio 1.6 g/dL    BUN/Creatinine Ratio 17.5 7.0 - 25.0    Anion Gap 11.0 5.0 - 15.0 mmol/L    eGFR 84.5 >60.0 mL/min/1.73   Lipase    Collection Time: 05/17/22  8:02 AM    Specimen: Blood   Result Value Ref Range    Lipase 14 13 - 60 U/L   Urinalysis With Microscopic If Indicated (No Culture) - Urine, Clean Catch    Collection Time: 05/17/22  8:02 AM    Specimen: Urine, Clean Catch   Result Value Ref Range    Color, UA Yellow Yellow, Straw    Appearance, UA Clear Clear    pH, UA <=5.0 5.0 - 8.0    Specific Gravity, UA 1.019 1.001 - 1.030    Glucose, UA Negative Negative    Ketones, UA Negative Negative    Bilirubin, UA Negative Negative    Blood, UA Negative Negative    Protein, UA Negative Negative    Leuk Esterase, UA Negative Negative    Nitrite, UA Negative Negative    Urobilinogen, UA 0.2 E.U./dL 0.2 - 1.0 E.U./dL   Lactic Acid, Plasma    Collection Time: 05/17/22  8:02 AM    Specimen: Blood   Result Value Ref Range    Lactate 1.4 0.5 - 2.0 mmol/L   Green Top (Gel)    Collection Time: 05/17/22  8:02 AM   Result Value Ref Range    Extra Tube Hold for add-ons.    Lavender Top    Collection Time: 05/17/22  8:02 AM   Result Value Ref Range    Extra Tube hold for add-on    Gold Top - SST    Collection Time: 05/17/22  8:02 AM   Result Value Ref Range    Extra Tube Hold for add-ons.    Light Blue Top    Collection Time: 05/17/22  8:02 AM   Result Value Ref Range    Extra Tube Hold for add-ons.    CBC Auto Differential    Collection Time: 05/17/22  8:02 AM    Specimen: Blood   Result Value Ref Range    WBC 5.41 3.40 - 10.80 10*3/mm3    RBC 4.50 4.14 - 5.80 10*6/mm3    Hemoglobin  "14.0 13.0 - 17.7 g/dL    Hematocrit 41.6 37.5 - 51.0 %    MCV 92.4 79.0 - 97.0 fL    MCH 31.1 26.6 - 33.0 pg    MCHC 33.7 31.5 - 35.7 g/dL    RDW 13.8 12.3 - 15.4 %    RDW-SD 47.5 37.0 - 54.0 fl    MPV 9.9 6.0 - 12.0 fL    Platelets 220 140 - 450 10*3/mm3    Neutrophil % 62.1 42.7 - 76.0 %    Lymphocyte % 24.2 19.6 - 45.3 %    Monocyte % 8.9 5.0 - 12.0 %    Eosinophil % 3.7 0.3 - 6.2 %    Basophil % 0.9 0.0 - 1.5 %    Immature Grans % 0.2 0.0 - 0.5 %    Neutrophils, Absolute 3.36 1.70 - 7.00 10*3/mm3    Lymphocytes, Absolute 1.31 0.70 - 3.10 10*3/mm3    Monocytes, Absolute 0.48 0.10 - 0.90 10*3/mm3    Eosinophils, Absolute 0.20 0.00 - 0.40 10*3/mm3    Basophils, Absolute 0.05 0.00 - 0.20 10*3/mm3    Immature Grans, Absolute 0.01 0.00 - 0.05 10*3/mm3    nRBC 0.0 0.0 - 0.2 /100 WBC     Note: In addition to lab results from this visit, the labs listed above may include labs taken at another facility or during a different encounter within the last 24 hours. Please correlate lab times with ED admission and discharge times for further clarification of the services performed during this visit.    CT Abdomen Pelvis Without Contrast   Final Result   1. Obstructing 8 x 7 mm calculus at the proximal right ureter resulting   in moderate right hydroureteronephrosis.   2. Prostatomegaly.   3. Colonic diverticulosis without diverticulitis.   4. Additional chronic findings above.       This report was finalized on 5/17/2022 8:25 AM by Osmin Miller MD.            Vitals:    05/17/22 0739 05/17/22 0847 05/17/22 0930   BP: (!) 182/90 179/97 151/79   BP Location: Right arm Right arm Right arm   Patient Position: Sitting Lying Lying   Pulse: (!) 45 (!) 45 (!) 43   Resp: 16 22 20   Temp: 97.5 °F (36.4 °C)     TempSrc: Oral     SpO2: 97% 96% 94%   Weight: 79.4 kg (175 lb)     Height: 182.9 cm (72\")       Medications   Sodium Chloride (PF) 0.9 % 10 mL (has no administration in time range)   ketorolac (TORADOL) injection 15 mg (15 mg " Intravenous Given 5/17/22 0841)   ondansetron (ZOFRAN) injection 4 mg (4 mg Intravenous Given 5/17/22 0841)   sodium chloride 0.9 % bolus 1,000 mL (0 mL Intravenous Stopped 5/17/22 0956)   lidocaine (XYLOCAINE) 1 % 125 mg in sodium chloride 0.9 % 250 mL IVPB (125 mg Intravenous Given 5/17/22 0959)   Morphine sulfate (PF) injection 4 mg (4 mg Intravenous Given 5/17/22 0902)     ECG/EMG Results (last 24 hours)     ** No results found for the last 24 hours. **        No orders to display               MDM  Number of Diagnoses or Management Options  Renal colic on right side: new and requires workup  Right ureteral stone: new and requires workup     Amount and/or Complexity of Data Reviewed  Clinical lab tests: reviewed and ordered  Tests in the radiology section of CPT®: reviewed and ordered  Tests in the medicine section of CPT®: ordered and reviewed  Discuss the patient with other providers: yes    Patient Progress  Patient progress: stable      Final diagnoses:   Renal colic on right side   Right ureteral stone       ED Disposition  ED Disposition     ED Disposition   Discharge    Condition   Stable    Comment   --             Rajeev Edwards MD  [address] [address]  [phone]      Call for appointment         Medication List      New Prescriptions    HYDROcodone-acetaminophen 5-325 MG per tablet  Commonly known as: NORCO  Take 1 tablet by mouth Every 6 (Six) Hours As Needed for Severe Pain .     ketorolac 10 MG tablet  Commonly known as: TORADOL  Take 1 tablet by mouth Every 6 (Six) Hours As Needed for Moderate Pain . Was given IV Toradol in ED.     tamsulosin 0.4 MG capsule 24 hr capsule  Commonly known as: FLOMAX  Take 1 capsule by mouth Every Night.           Where to Get Your Medications      These medications were sent to [facility] - [address] [facility] [address] AT [facility] - [phone] [facility] [phone] FX  [address] [facility] [address], [facility] [address]    Phone:  [phone]   · HYDROcodone-acetaminophen 5-325 MG per tablet  · ketorolac 10 MG tablet  · tamsulosin 0.4 MG capsule 24 hr capsule          Rafa Rodriguez PA  05/17/22 7231

## 2022-05-18 ENCOUNTER — PATIENT MESSAGE (OUTPATIENT)
Dept: FAMILY MEDICINE CLINIC | Facility: CLINIC | Age: 72
End: 2022-05-18

## 2022-09-17 ENCOUNTER — PATIENT MESSAGE (OUTPATIENT)
Dept: FAMILY MEDICINE CLINIC | Facility: CLINIC | Age: 72
End: 2022-09-17

## 2022-10-19 ENCOUNTER — FLU SHOT (OUTPATIENT)
Dept: FAMILY MEDICINE CLINIC | Facility: CLINIC | Age: 72
End: 2022-10-19

## 2022-10-19 DIAGNOSIS — Z23 IMMUNIZATION DUE: Primary | ICD-10-CM

## 2022-10-19 PROCEDURE — G0008 ADMIN INFLUENZA VIRUS VAC: HCPCS | Performed by: FAMILY MEDICINE

## 2022-10-19 PROCEDURE — 90662 IIV NO PRSV INCREASED AG IM: CPT | Performed by: FAMILY MEDICINE

## 2022-10-19 PROCEDURE — 90677 PCV20 VACCINE IM: CPT | Performed by: FAMILY MEDICINE

## 2023-02-26 DIAGNOSIS — E78.00 PURE HYPERCHOLESTEROLEMIA: ICD-10-CM

## 2023-02-27 RX ORDER — ATORVASTATIN CALCIUM 20 MG/1
TABLET, FILM COATED ORAL
Qty: 90 TABLET | Refills: 0 | Status: SHIPPED | OUTPATIENT
Start: 2023-02-27 | End: 2023-03-13 | Stop reason: SDUPTHER

## 2023-03-02 ENCOUNTER — PATIENT MESSAGE (OUTPATIENT)
Dept: FAMILY MEDICINE CLINIC | Facility: CLINIC | Age: 73
End: 2023-03-02
Payer: MEDICARE

## 2023-03-02 DIAGNOSIS — Z13.0 SCREENING FOR DEFICIENCY ANEMIA: ICD-10-CM

## 2023-03-02 DIAGNOSIS — Z12.5 SCREENING FOR MALIGNANT NEOPLASM OF PROSTATE: ICD-10-CM

## 2023-03-02 DIAGNOSIS — E78.00 PURE HYPERCHOLESTEROLEMIA: Primary | ICD-10-CM

## 2023-03-02 DIAGNOSIS — Z13.1 SCREENING FOR DIABETES MELLITUS: ICD-10-CM

## 2023-03-07 ENCOUNTER — LAB (OUTPATIENT)
Dept: LAB | Facility: HOSPITAL | Age: 73
End: 2023-03-07
Payer: MEDICARE

## 2023-03-07 DIAGNOSIS — Z13.1 SCREENING FOR DIABETES MELLITUS: ICD-10-CM

## 2023-03-07 DIAGNOSIS — E78.00 PURE HYPERCHOLESTEROLEMIA: ICD-10-CM

## 2023-03-07 DIAGNOSIS — Z12.5 SCREENING FOR MALIGNANT NEOPLASM OF PROSTATE: ICD-10-CM

## 2023-03-07 DIAGNOSIS — Z13.0 SCREENING FOR DEFICIENCY ANEMIA: ICD-10-CM

## 2023-03-07 LAB
ALBUMIN SERPL-MCNC: 4.3 G/DL (ref 3.5–5.2)
ALBUMIN/GLOB SERPL: 1.7 G/DL
ALP SERPL-CCNC: 80 U/L (ref 39–117)
ALT SERPL W P-5'-P-CCNC: 20 U/L (ref 1–41)
ANION GAP SERPL CALCULATED.3IONS-SCNC: 6.1 MMOL/L (ref 5–15)
AST SERPL-CCNC: 27 U/L (ref 1–40)
BILIRUB SERPL-MCNC: 0.7 MG/DL (ref 0–1.2)
BUN SERPL-MCNC: 14 MG/DL (ref 8–23)
BUN/CREAT SERPL: 13.9 (ref 7–25)
CALCIUM SPEC-SCNC: 9.3 MG/DL (ref 8.6–10.5)
CHLORIDE SERPL-SCNC: 107 MMOL/L (ref 98–107)
CHOLEST SERPL-MCNC: 137 MG/DL (ref 0–200)
CO2 SERPL-SCNC: 29.9 MMOL/L (ref 22–29)
CREAT SERPL-MCNC: 1.01 MG/DL (ref 0.76–1.27)
DEPRECATED RDW RBC AUTO: 40.4 FL (ref 37–54)
EGFRCR SERPLBLD CKD-EPI 2021: 80 ML/MIN/1.73
ERYTHROCYTE [DISTWIDTH] IN BLOOD BY AUTOMATED COUNT: 11.8 % (ref 12.3–15.4)
GLOBULIN UR ELPH-MCNC: 2.6 GM/DL
GLUCOSE SERPL-MCNC: 86 MG/DL (ref 65–99)
HCT VFR BLD AUTO: 42.8 % (ref 37.5–51)
HDLC SERPL-MCNC: 63 MG/DL (ref 40–60)
HGB BLD-MCNC: 14.3 G/DL (ref 13–17.7)
LDLC SERPL CALC-MCNC: 60 MG/DL (ref 0–100)
LDLC/HDLC SERPL: 0.95 {RATIO}
MCH RBC QN AUTO: 31 PG (ref 26.6–33)
MCHC RBC AUTO-ENTMCNC: 33.4 G/DL (ref 31.5–35.7)
MCV RBC AUTO: 92.6 FL (ref 79–97)
PLATELET # BLD AUTO: 234 10*3/MM3 (ref 140–450)
PMV BLD AUTO: 10.6 FL (ref 6–12)
POTASSIUM SERPL-SCNC: 4.3 MMOL/L (ref 3.5–5.2)
PROT SERPL-MCNC: 6.9 G/DL (ref 6–8.5)
PSA SERPL-MCNC: 0.85 NG/ML (ref 0–4)
RBC # BLD AUTO: 4.62 10*6/MM3 (ref 4.14–5.8)
SODIUM SERPL-SCNC: 143 MMOL/L (ref 136–145)
TRIGL SERPL-MCNC: 71 MG/DL (ref 0–150)
VLDLC SERPL-MCNC: 14 MG/DL (ref 5–40)
WBC NRBC COR # BLD: 6.88 10*3/MM3 (ref 3.4–10.8)

## 2023-03-07 PROCEDURE — 85027 COMPLETE CBC AUTOMATED: CPT

## 2023-03-07 PROCEDURE — G0103 PSA SCREENING: HCPCS

## 2023-03-07 PROCEDURE — 80053 COMPREHEN METABOLIC PANEL: CPT

## 2023-03-07 PROCEDURE — 80061 LIPID PANEL: CPT

## 2023-03-13 ENCOUNTER — OFFICE VISIT (OUTPATIENT)
Dept: FAMILY MEDICINE CLINIC | Facility: CLINIC | Age: 73
End: 2023-03-13
Payer: MEDICARE

## 2023-03-13 VITALS
OXYGEN SATURATION: 96 % | SYSTOLIC BLOOD PRESSURE: 120 MMHG | HEART RATE: 76 BPM | DIASTOLIC BLOOD PRESSURE: 76 MMHG | WEIGHT: 170 LBS | HEIGHT: 72 IN | BODY MASS INDEX: 23.03 KG/M2

## 2023-03-13 DIAGNOSIS — Z12.11 SCREENING FOR MALIGNANT NEOPLASM OF COLON: ICD-10-CM

## 2023-03-13 DIAGNOSIS — K22.2 SCHATZKI'S RING: ICD-10-CM

## 2023-03-13 DIAGNOSIS — Z83.71 FH: COLON POLYPS: ICD-10-CM

## 2023-03-13 DIAGNOSIS — E78.00 PURE HYPERCHOLESTEROLEMIA: ICD-10-CM

## 2023-03-13 DIAGNOSIS — R00.2 PALPITATIONS: ICD-10-CM

## 2023-03-13 DIAGNOSIS — Z00.00 MEDICARE ANNUAL WELLNESS VISIT, SUBSEQUENT: Primary | ICD-10-CM

## 2023-03-13 DIAGNOSIS — M25.551 CHRONIC RIGHT HIP PAIN: ICD-10-CM

## 2023-03-13 DIAGNOSIS — G89.29 CHRONIC RIGHT HIP PAIN: ICD-10-CM

## 2023-03-13 PROCEDURE — 1170F FXNL STATUS ASSESSED: CPT | Performed by: FAMILY MEDICINE

## 2023-03-13 PROCEDURE — 1125F AMNT PAIN NOTED PAIN PRSNT: CPT | Performed by: FAMILY MEDICINE

## 2023-03-13 PROCEDURE — 1159F MED LIST DOCD IN RCRD: CPT | Performed by: FAMILY MEDICINE

## 2023-03-13 PROCEDURE — G0439 PPPS, SUBSEQ VISIT: HCPCS | Performed by: FAMILY MEDICINE

## 2023-03-13 RX ORDER — ATORVASTATIN CALCIUM 20 MG/1
20 TABLET, FILM COATED ORAL EVERY EVENING
Qty: 90 TABLET | Refills: 3 | Status: SHIPPED | OUTPATIENT
Start: 2023-03-13

## 2023-03-13 NOTE — PATIENT INSTRUCTIONS
Diclofenac (voltaren) gel 1% up to 4 times a day for joint pain.    Medicare Wellness  Personal Prevention Plan of Service     Date of Office Visit:    Encounter Provider:  Monica Yun MD  Place of Service:  [facility]  Patient Name: Claude Lucas  :  1950    As part of the Medicare Wellness portion of your visit today, we are providing you with this personalized preventive plan of services (PPPS). This plan is based upon recommendations of the [place] Preventive Services Task Force (USPSTF) and the Advisory Committee on Immunization Practices (ACIP).    This lists the preventive care services that should be considered, and provides dates of when you are due. Items listed as completed are up-to-date and do not require any further intervention.    Health Maintenance   Topic Date Due    COLORECTAL CANCER SCREENING  2023    LIPID PANEL  2024    ANNUAL WELLNESS VISIT  2024    TDAP/TD VACCINES (3 - Td or Tdap) 2030    HEPATITIS C SCREENING  Completed    COVID-19 Vaccine  Completed    INFLUENZA VACCINE  Completed    Pneumococcal Vaccine 65+  Completed    ZOSTER VACCINE  Completed       Orders Placed This Encounter   Procedures    Ambulatory referral for Screening EGD     Referral Priority:   Routine     Referral Type:   Diagnostic Medical     Referral Reason:   Specialty Services Required     Referred to Provider:   Chandrakant Carr MD     Number of Visits Requested:   1    Ambulatory Referral For Screening Colonoscopy     Referral Priority:   Routine     Referral Type:   Diagnostic Medical     Referral Reason:   Specialty Services Required     Referred to Provider:   Chandrakant Carr MD     Number of Visits Requested:   1       Return in about 1 year (around 3/13/2024) for MWV and fasting labs.

## 2023-03-13 NOTE — PROGRESS NOTES
The ABCs of the Annual Wellness Visit  Subsequent Medicare Wellness Visit    Subjective    Claude Lucas is a 72 y.o. male who presents for a Subsequent Medicare Wellness Visit.    The following portions of the patient's history were reviewed and   updated as appropriate: allergies, current medications, past family history, past medical history, past social history, past surgical history and problem list.    Compared to one year ago, the patient feels his physical   health is worse.    Compared to one year ago, the patient feels his mental   health is worse.    Recent Hospitalizations:  He was not admitted to the hospital during the last year.       Current Medical Providers:  Patient Care Team:  Monica Yun MD as PCP - General (Family Medicine)  Chandrakant Carr MD as Consulting Physician (Gastroenterology)  Barbra Foreman MD as Consulting Physician (Dermatology)    Outpatient Medications Prior to Visit   Medication Sig Dispense Refill   • fexofenadine (ALLEGRA) 180 MG tablet Take 1 tablet by mouth Daily. Pt states that he only takes this in the spring time.     • fluticasone (FLONASE) 50 MCG/ACT nasal spray 2 sprays into the nostril(s) as directed by provider As Needed for Rhinitis.     • glucosamine-chondroitin 500-400 MG capsule capsule Take  by mouth 2 (two) times a day.     • multivitamin with minerals tablet tablet Take 1 tablet by mouth Daily.     • atorvastatin (LIPITOR) 20 MG tablet TAKE ONE TABLET BY MOUTH EVERY EVENING 90 tablet 0   • tamsulosin (FLOMAX) 0.4 MG capsule 24 hr capsule Take 1 capsule by mouth Every Night. 30 capsule 0   • Diclofenac Sodium (VOLTAREN) 1 % gel gel Apply 4 g topically to the appropriate area as directed 4 (Four) Times a Day As Needed (joint pain). 150 g 3   • HYDROcodone-acetaminophen (NORCO) 5-325 MG per tablet Take 1 tablet by mouth Every 6 (Six) Hours As Needed for Severe Pain . 12 tablet 0   • ketorolac (TORADOL) 10 MG tablet Take 1 tablet by mouth  "Every 6 (Six) Hours As Needed for Moderate Pain . Was given IV Toradol in ED. 12 tablet 0     No facility-administered medications prior to visit.       No opioid medication identified on active medication list. I have reviewed chart for other potential  high risk medication/s and harmful drug interactions in the elderly.          Aspirin is not on active medication list.  Aspirin use is not indicated based on review of current medical condition/s. Risk of harm outweighs potential benefits.  .    Patient Active Problem List   Diagnosis   • Rheumatic mitral regurgitation   • Benign prostatic hyperplasia with nocturia   • Diverticulosis of large intestine without hemorrhage   • Renal stones   • Gastroesophageal reflux disease   • Schatzki's ring   • History of rheumatic fever   • Pure hypercholesterolemia   • MVP (mitral valve prolapse)   • FH: colon polyps   • Memory changes   • Chronic right hip pain   • Palpitations     Advance Care Planning  Advance Directive is on file.  ACP discussion was held with the patient during this visit. Patient has an advance directive in EMR which is still valid.      Objective    Vitals:    03/13/23 0808   BP: 120/76   Pulse: 76   SpO2: 96%   Weight: 77.1 kg (170 lb)   Height: 182.9 cm (72.01\")   PainSc:   1     Estimated body mass index is 23.05 kg/m² as calculated from the following:    Height as of this encounter: 182.9 cm (72.01\").    Weight as of this encounter: 77.1 kg (170 lb).    BMI is within normal parameters. No other follow-up for BMI required.      Does the patient have evidence of cognitive impairment? No    Lab Results   Component Value Date    TRIG 71 03/07/2023    HDL 63 (H) 03/07/2023    LDL 60 03/07/2023    VLDL 14 03/07/2023        HEALTH RISK ASSESSMENT    Smoking Status:  Social History     Tobacco Use   Smoking Status Never   Smokeless Tobacco Never     Alcohol Consumption:  Social History     Substance and Sexual Activity   Alcohol Use Yes   • Alcohol/week: 6.0 " standard drinks   • Types: 3 Glasses of wine, 3 Drinks containing 0.5 oz of alcohol per week    Comment: occasional     Fall Risk Screen:    CYNTHIA Fall Risk Assessment was completed, and patient is at LOW risk for falls.Assessment completed on:3/13/2023    Depression Screening:  PHQ-2/PHQ-9 Depression Screening 3/13/2023   Little Interest or Pleasure in Doing Things 0-->not at all   Feeling Down, Depressed or Hopeless 0-->not at all   PHQ-9: Brief Depression Severity Measure Score 0       Health Habits and Functional and Cognitive Screening:  Functional & Cognitive Status 3/13/2023   Do you have difficulty preparing food and eating? No   Do you have difficulty bathing yourself, getting dressed or grooming yourself? No   Do you have difficulty using the toilet? No   Do you have difficulty moving around from place to place? No   Do you have trouble with steps or getting out of a bed or a chair? No   Current Diet Well Balanced Diet   Dental Exam Up to date   Eye Exam Not up to date   Exercise (times per week) 5 times per week   Current Exercises Include Stationary Bicycling/Spin Class;Bicycling Outdoors   Current Exercise Activities Include -   Do you need help using the phone?  No   Are you deaf or do you have serious difficulty hearing?  No   Do you need help with transportation? No   Do you need help shopping? No   Do you need help preparing meals?  No   Do you need help with housework?  No   Do you need help with laundry? No   Do you need help taking your medications? No   Do you need help managing money? No   Do you ever drive or ride in a car without wearing a seat belt? No   Have you felt unusual stress, anger or loneliness in the last month? No   Who do you live with? Spouse   If you need help, do you have trouble finding someone available to you? No   Have you been bothered in the last four weeks by sexual problems? No   Do you have difficulty concentrating, remembering or making decisions? No        Age-appropriate Screening Schedule:  Refer to the list below for future screening recommendations based on patient's age, sex and/or medical conditions. Orders for these recommended tests are listed in the plan section. The patient has been provided with a written plan.    Health Maintenance   Topic Date Due   • COLORECTAL CANCER SCREENING  03/29/2023   • LIPID PANEL  03/07/2024   • ANNUAL WELLNESS VISIT  03/13/2024   • TDAP/TD VACCINES (3 - Td or Tdap) 02/28/2030   • HEPATITIS C SCREENING  Completed   • COVID-19 Vaccine  Completed   • INFLUENZA VACCINE  Completed   • Pneumococcal Vaccine 65+  Completed   • ZOSTER VACCINE  Completed              Immunization History   Administered Date(s) Administered   • COVID-19 (PFIZER) BIVALENT BOOSTER 12+YRS 12/21/2022   • COVID-19 (PFIZER) PURPLE CAP 03/08/2021, 03/27/2021, 09/08/2021   • Covid-19 (Pfizer) Gray Cap 03/31/2022   • Fluad Quad 65+ 10/09/2020   • Fluzone High Dose =>65 Years ([employer] ONLY) 10/23/2017, 10/11/2018, 10/03/2019   • Fluzone High-Dose 65+yrs 09/24/2021, 10/19/2022   • Pneumococcal Conjugate 13-Valent (PCV13) 11/27/2017   • Pneumococcal Conjugate 20-Valent (PCV20) 10/19/2022   • Pneumococcal Polysaccharide (PPSV23) 02/22/2019   • Shingrix 03/16/2019, 06/04/2019   • Tdap 01/01/2010, 02/28/2020   • Zostavax 01/01/2015         CMS Preventative Services Quick Reference  Risk Factors Identified During Encounter  Chronic Pain: NSAIDs per medication orders.  Physical therapy and x-ray if pain not improved  The above risks/problems have been discussed with the patient.  Pertinent information has been shared with the patient in the After Visit Summary.  An After Visit Summary and PPPS were made available to the patient.    Follow Up:   Next Medicare Wellness visit to be scheduled in 1 year.       Additional E&M Note during same encounter follows:  Patient has multiple medical problems which are significant and separately identifiable that require additional  "work above and beyond the Medicare Wellness Visit.      Chief Complaint  Medicare Wellness-subsequent (Right hip pain, left big toe pain) and Annual Exam    Subjective        HPI  Claude Lucas is also being seen today for physical, joint pain    He likes a lot of sweets. Balanced diet. Snacks and dessert after dinner tonight. He has  in basement, cycles outside when weather is better about 2-3 times a week.     Allegra and flonase when needed in Spring/Fall and less in winter.     Difficulty swallowing. He has to drink a lot of liquids while eating part of the way down/ 15 or more years ago he had EGD and colonoscopy, diagnosed with esophageal ring which was dilated. Started back again 6 months ago.   He is also due for colonoscopy and would like both at the same time.    Daughter has psoriatic arthritis. Pain in bilateral thumb joints with ache. NSAIDs help. New right hip pain for a couple months and feels a catch if he bends over. New left great toe pain. He has tried home exercises and hip pain was worse.     He had COVID in September 2022. A couple times of change in heart beat a couple times. Last time was a couple weeks ago. Intermittent. Blood pressure is ok. He wears heart rate monitor while cycling, zone 135-145. He denies chest pain.                Objective   Vital Signs:  /76   Pulse 76   Ht 182.9 cm (72.01\")   Wt 77.1 kg (170 lb)   SpO2 96%   BMI 23.05 kg/m²     Physical Exam  Constitutional:       General: He is not in acute distress.  HENT:      Right Ear: Tympanic membrane and ear canal normal.      Left Ear: Tympanic membrane and ear canal normal.   Eyes:      General:         Right eye: No discharge.         Left eye: No discharge.      Conjunctiva/sclera: Conjunctivae normal.   Neck:      Thyroid: No thyromegaly.   Cardiovascular:      Rate and Rhythm: Normal rate and regular rhythm.   Pulmonary:      Effort: Pulmonary effort is normal.      Breath sounds: Normal breath " sounds.   Abdominal:      Palpations: Abdomen is soft. There is no hepatomegaly.      Tenderness: There is no abdominal tenderness.   Musculoskeletal:      Cervical back: Neck supple.      Comments: Bilateral thumbs without deformity   Lymphadenopathy:      Head:      Right side of head: No submandibular, preauricular or posterior auricular adenopathy.      Left side of head: No submandibular, preauricular or posterior auricular adenopathy.      Cervical: No cervical adenopathy.   Skin:     General: Skin is warm.   Neurological:      Mental Status: He is alert and oriented to person, place, and time.      Gait: Gait normal.   Psychiatric:         Mood and Affect: Mood normal.         Behavior: Behavior normal.         Thought Content: Thought content normal.         Judgment: Judgment normal.          The following data was reviewed by: Monica Yun MD on 03/13/2023:  Common labs    Common Labs 5/17/22 5/17/22 3/7/23 3/7/23 3/7/23 3/7/23    0802 0802 0819 0819 0819 0819   Glucose  142 (A)  86     BUN  17  14     Creatinine  0.97  1.01     Sodium  139  143     Potassium  4.2  4.3     Chloride  103  107     Calcium  9.1  9.3     Albumin  4.10  4.3     Total Bilirubin  0.7  0.7     Alkaline Phosphatase  83  80     AST (SGOT)  24  27     ALT (SGPT)  20  20     WBC 5.41  6.88      Hemoglobin 14.0  14.3      Hematocrit 41.6  42.8      Platelets 220  234      Total Cholesterol     137    Triglycerides     71    HDL Cholesterol     63 (A)    LDL Cholesterol      60    PSA      0.851   (A) Abnormal value            PSA    PSA 3/7/23   PSA 0.851                      Assessment and Plan   Diagnoses and all orders for this visit:    1. Medicare annual wellness visit, subsequent (Primary)  Reviewed previsit labs with patient in detail.  Immunizations up-to-date.  2. Pure hypercholesterolemia  -     atorvastatin (LIPITOR) 20 MG tablet; Take 1 tablet by mouth Every Evening.  Dispense: 90 tablet; Refill: 3  LDL at goal.   Continue atorvastatin.  3. Schatzki's ring  -     Ambulatory referral for Screening EGD  History of prior dilation and now symptoms of dysphagia needs repeat EGD.  4. FH: colon polyps  -     Ambulatory Referral For Screening Colonoscopy  Colonoscopy due this month.  5. Screening for malignant neoplasm of colon  -     Ambulatory Referral For Screening Colonoscopy  Colonoscopy due this month.  6. Chronic right hip pain  New.  Patient would like to monitor for now.  If worsening I would recommend x-rays and physical therapy  7. Palpitations  New.  Normal cardiac exam today and infrequent symptoms.  Discussed risk of arrhythmia including A-fib and further work-up to include Holter monitor.  At this time he chose to monitor and let me know if worsening.           Follow Up   Return in about 1 year (around 3/13/2024) for MWV and fasting labs.  Patient was given instructions and counseling regarding his condition or for health maintenance advice. Please see specific information pulled into the AVS if appropriate.     Electronically signed by Monica Yun MD, 03/13/23, 8:40 AM EDT.

## 2023-04-19 RX ORDER — SODIUM, POTASSIUM,MAG SULFATES 17.5-3.13G
SOLUTION, RECONSTITUTED, ORAL ORAL
Qty: 354 ML | Refills: 0 | Status: SHIPPED | OUTPATIENT
Start: 2023-04-19

## 2023-04-26 ENCOUNTER — OUTSIDE FACILITY SERVICE (OUTPATIENT)
Dept: GASTROENTEROLOGY | Facility: CLINIC | Age: 73
End: 2023-04-26
Payer: MEDICARE

## 2023-04-26 PROCEDURE — 43239 EGD BIOPSY SINGLE/MULTIPLE: CPT | Performed by: INTERNAL MEDICINE

## 2023-04-26 PROCEDURE — G0105 COLORECTAL SCRN; HI RISK IND: HCPCS | Performed by: INTERNAL MEDICINE

## 2023-04-26 PROCEDURE — 43249 ESOPH EGD DILATION <30 MM: CPT | Performed by: INTERNAL MEDICINE

## 2023-04-29 NOTE — PROGRESS NOTES
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  Patient was recommended to taking over-the-counter PPI and have a repeat upper endoscopy in 3 years.  Colonoscopy was remarkable only for diverticulosis and a 5-year screening interval was recommended.  Please see letter to patient's for more details

## 2023-05-03 PROBLEM — K22.70 BARRETT'S ESOPHAGUS WITHOUT DYSPLASIA: Status: ACTIVE | Noted: 2023-05-03

## 2023-10-09 RX ORDER — OMEPRAZOLE 20 MG/1
20 CAPSULE, DELAYED RELEASE ORAL DAILY
Qty: 90 CAPSULE | Refills: 3 | Status: SHIPPED | OUTPATIENT
Start: 2023-10-09

## 2023-10-19 ENCOUNTER — CLINICAL SUPPORT (OUTPATIENT)
Age: 73
End: 2023-10-19
Payer: MEDICARE

## 2023-10-19 PROCEDURE — 90662 IIV NO PRSV INCREASED AG IM: CPT | Performed by: FAMILY MEDICINE

## 2023-10-19 PROCEDURE — G0008 ADMIN INFLUENZA VIRUS VAC: HCPCS | Performed by: FAMILY MEDICINE

## 2023-12-13 ENCOUNTER — OFFICE VISIT (OUTPATIENT)
Age: 73
End: 2023-12-13
Payer: MEDICARE

## 2023-12-13 VITALS
DIASTOLIC BLOOD PRESSURE: 84 MMHG | OXYGEN SATURATION: 96 % | HEART RATE: 51 BPM | HEIGHT: 72 IN | SYSTOLIC BLOOD PRESSURE: 160 MMHG | WEIGHT: 165.8 LBS | BODY MASS INDEX: 22.46 KG/M2

## 2023-12-13 DIAGNOSIS — R03.0 ELEVATED BLOOD PRESSURE READING: ICD-10-CM

## 2023-12-13 DIAGNOSIS — K57.92 DIVERTICULITIS: Primary | ICD-10-CM

## 2023-12-13 PROCEDURE — 99213 OFFICE O/P EST LOW 20 MIN: CPT | Performed by: FAMILY MEDICINE

## 2023-12-13 PROCEDURE — 1160F RVW MEDS BY RX/DR IN RCRD: CPT | Performed by: FAMILY MEDICINE

## 2023-12-13 PROCEDURE — 1159F MED LIST DOCD IN RCRD: CPT | Performed by: FAMILY MEDICINE

## 2023-12-13 RX ORDER — AMOXICILLIN AND CLAVULANATE POTASSIUM 875; 125 MG/1; MG/1
1 TABLET, FILM COATED ORAL 2 TIMES DAILY
Qty: 20 TABLET | Refills: 0 | Status: SHIPPED | OUTPATIENT
Start: 2023-12-13 | End: 2023-12-23

## 2023-12-13 NOTE — PROGRESS NOTES
"Chief Complaint  Diarrhea (Started Saturday night- days. Have only been eating chicken broth, yogurt, etc./No fevers, no chills, no aching, hasn't felt bad.)    Subjective        Claude Lucas presents to [facility]  Diarrhea   This is a new problem. The current episode started in the past 7 days. Pertinent negatives include no chills, fever or myalgias.   Onset 12/9/23. He ate toast, yogurt and applesauce diarrhea diet. Around 330 am he was hours in the bathroom with liquid stools. Wakes up with fecal incontinence and he wore depends. Nocturia every 2 hours. He took loperamide most recent dose at 4am and still had 2-3 more diarrhea stools. Wife is not sick. He has a raspy voice a couple days.       Review of Systems   Constitutional:  Negative for chills and fever.   Gastrointestinal:  Positive for diarrhea.   Musculoskeletal:  Negative for myalgias.         Objective   Vital Signs:  /84   Pulse 51   Ht 182.9 cm (72.01\")   Wt 75.2 kg (165 lb 12.8 oz)   SpO2 96%   BMI 22.48 kg/m²   Estimated body mass index is 22.48 kg/m² as calculated from the following:    Height as of this encounter: 182.9 cm (72.01\").    Weight as of this encounter: 75.2 kg (165 lb 12.8 oz).       Vitals:    12/13/23 1046 12/13/23 1110   BP: 160/84    Pulse: 51    SpO2: 90% 96%   Weight: 75.2 kg (165 lb 12.8 oz)    Height: 182.9 cm (72.01\")        BMI is within normal parameters. No other follow-up for BMI required.      Physical Exam  Vitals reviewed.   Constitutional:       General: He is not in acute distress.     Appearance: He is not ill-appearing, toxic-appearing or diaphoretic.   Cardiovascular:      Rate and Rhythm: Normal rate and regular rhythm.   Pulmonary:      Effort: Pulmonary effort is normal.      Breath sounds: Normal breath sounds.   Abdominal:      General: Bowel sounds are increased.      Palpations: Abdomen is soft. There is no hepatomegaly or splenomegaly.      Tenderness: There " is abdominal tenderness in the left lower quadrant. There is no guarding or rebound.   Skin:     Capillary Refill: Capillary refill takes less than 2 seconds.   Neurological:      Mental Status: He is alert.   Psychiatric:         Mood and Affect: Mood normal.        Result Review :          CT Abdomen Pelvis Without Contrast (05/17/2022 08:13)          Assessment and Plan   Diagnoses and all orders for this visit:    1. Diverticulitis (Primary)  -     amoxicillin-clavulanate (AUGMENTIN) 875-125 MG per tablet; Take 1 tablet by mouth 2 (Two) Times a Day for 10 days.  Dispense: 20 tablet; Refill: 0  Known diverticulitis now with acute diverticulitis symptoms.  Start Augmentin.  Diet as tolerated and continue hydration.  He does not appear dehydrated on exam.  Discussed worsening symptoms and when to seek care at the emergency department.  2. Elevated blood pressure reading  Likely related to acute illness.  He has no diagnosis of hypertension.  Also repeated pulse ox on a different finger which was normal and his lung exam is clear.           Follow Up   Return if symptoms worsen or fail to improve.  Patient was given instructions and counseling regarding his condition or for health maintenance advice. Please see specific information pulled into the AVS if appropriate.     Electronically signed by Monica Yun MD, 12/13/23, 11:11 AM EST.

## 2023-12-26 ENCOUNTER — PATIENT MESSAGE (OUTPATIENT)
Age: 73
End: 2023-12-26
Payer: MEDICARE

## 2023-12-26 RX ORDER — LEVOFLOXACIN 750 MG/1
750 TABLET, FILM COATED ORAL DAILY
Qty: 5 TABLET | Refills: 0 | Status: SHIPPED | OUTPATIENT
Start: 2023-12-26 | End: 2023-12-31

## 2023-12-26 RX ORDER — METRONIDAZOLE 500 MG/1
500 TABLET ORAL EVERY 6 HOURS
Qty: 28 TABLET | Refills: 0 | Status: SHIPPED | OUTPATIENT
Start: 2023-12-26 | End: 2024-01-02

## 2024-03-07 ENCOUNTER — PATIENT MESSAGE (OUTPATIENT)
Age: 74
End: 2024-03-07
Payer: MEDICARE

## 2024-03-07 DIAGNOSIS — Z13.0 SCREENING FOR DEFICIENCY ANEMIA: ICD-10-CM

## 2024-03-07 DIAGNOSIS — Z13.1 SCREENING FOR DIABETES MELLITUS: ICD-10-CM

## 2024-03-07 DIAGNOSIS — E78.00 PURE HYPERCHOLESTEROLEMIA: Primary | ICD-10-CM

## 2024-03-07 DIAGNOSIS — Z12.5 SCREENING FOR MALIGNANT NEOPLASM OF PROSTATE: ICD-10-CM

## 2024-03-11 ENCOUNTER — LAB (OUTPATIENT)
Dept: LAB | Facility: HOSPITAL | Age: 74
End: 2024-03-11
Payer: MEDICARE

## 2024-03-11 DIAGNOSIS — Z13.1 SCREENING FOR DIABETES MELLITUS: ICD-10-CM

## 2024-03-11 DIAGNOSIS — E78.00 PURE HYPERCHOLESTEROLEMIA: ICD-10-CM

## 2024-03-11 DIAGNOSIS — Z12.5 SCREENING FOR MALIGNANT NEOPLASM OF PROSTATE: ICD-10-CM

## 2024-03-11 DIAGNOSIS — Z13.0 SCREENING FOR DEFICIENCY ANEMIA: ICD-10-CM

## 2024-03-11 LAB
ALBUMIN SERPL-MCNC: 4.5 G/DL (ref 3.5–5.2)
ALBUMIN/GLOB SERPL: 1.8 G/DL
ALP SERPL-CCNC: 86 U/L (ref 39–117)
ALT SERPL W P-5'-P-CCNC: 23 U/L (ref 1–41)
ANION GAP SERPL CALCULATED.3IONS-SCNC: 12.1 MMOL/L (ref 5–15)
AST SERPL-CCNC: 26 U/L (ref 1–40)
BILIRUB SERPL-MCNC: 0.9 MG/DL (ref 0–1.2)
BUN SERPL-MCNC: 18 MG/DL (ref 8–23)
BUN/CREAT SERPL: 16.5 (ref 7–25)
CALCIUM SPEC-SCNC: 9.5 MG/DL (ref 8.6–10.5)
CHLORIDE SERPL-SCNC: 107 MMOL/L (ref 98–107)
CHOLEST SERPL-MCNC: 137 MG/DL (ref 0–200)
CO2 SERPL-SCNC: 26.9 MMOL/L (ref 22–29)
CREAT SERPL-MCNC: 1.09 MG/DL (ref 0.76–1.27)
DEPRECATED RDW RBC AUTO: 44.7 FL (ref 37–54)
EGFRCR SERPLBLD CKD-EPI 2021: 72.6 ML/MIN/1.73
ERYTHROCYTE [DISTWIDTH] IN BLOOD BY AUTOMATED COUNT: 12.6 % (ref 12.3–15.4)
GLOBULIN UR ELPH-MCNC: 2.5 GM/DL
GLUCOSE SERPL-MCNC: 90 MG/DL (ref 65–99)
HCT VFR BLD AUTO: 47.2 % (ref 37.5–51)
HDLC SERPL-MCNC: 50 MG/DL (ref 40–60)
HGB BLD-MCNC: 15.4 G/DL (ref 13–17.7)
LDLC SERPL CALC-MCNC: 73 MG/DL (ref 0–100)
LDLC/HDLC SERPL: 1.45 {RATIO}
MCH RBC QN AUTO: 31.2 PG (ref 26.6–33)
MCHC RBC AUTO-ENTMCNC: 32.6 G/DL (ref 31.5–35.7)
MCV RBC AUTO: 95.7 FL (ref 79–97)
PLATELET # BLD AUTO: 238 10*3/MM3 (ref 140–450)
PMV BLD AUTO: 11 FL (ref 6–12)
POTASSIUM SERPL-SCNC: 4.3 MMOL/L (ref 3.5–5.2)
PROT SERPL-MCNC: 7 G/DL (ref 6–8.5)
PSA SERPL-MCNC: 0.92 NG/ML (ref 0–4)
RBC # BLD AUTO: 4.93 10*6/MM3 (ref 4.14–5.8)
SODIUM SERPL-SCNC: 146 MMOL/L (ref 136–145)
TRIGL SERPL-MCNC: 72 MG/DL (ref 0–150)
VLDLC SERPL-MCNC: 14 MG/DL (ref 5–40)
WBC NRBC COR # BLD AUTO: 5.48 10*3/MM3 (ref 3.4–10.8)

## 2024-03-11 PROCEDURE — 80061 LIPID PANEL: CPT

## 2024-03-11 PROCEDURE — G0103 PSA SCREENING: HCPCS

## 2024-03-11 PROCEDURE — 85027 COMPLETE CBC AUTOMATED: CPT

## 2024-03-11 PROCEDURE — 80053 COMPREHEN METABOLIC PANEL: CPT

## 2024-03-14 ENCOUNTER — OFFICE VISIT (OUTPATIENT)
Age: 74
End: 2024-03-14
Payer: MEDICARE

## 2024-03-14 ENCOUNTER — PATIENT MESSAGE (OUTPATIENT)
Age: 74
End: 2024-03-14

## 2024-03-14 VITALS
OXYGEN SATURATION: 96 % | WEIGHT: 169.3 LBS | HEART RATE: 57 BPM | DIASTOLIC BLOOD PRESSURE: 82 MMHG | BODY MASS INDEX: 22.93 KG/M2 | SYSTOLIC BLOOD PRESSURE: 122 MMHG | HEIGHT: 72 IN

## 2024-03-14 DIAGNOSIS — N40.1 BENIGN PROSTATIC HYPERPLASIA WITH NOCTURIA: ICD-10-CM

## 2024-03-14 DIAGNOSIS — E78.00 PURE HYPERCHOLESTEROLEMIA: ICD-10-CM

## 2024-03-14 DIAGNOSIS — Z00.00 MEDICARE ANNUAL WELLNESS VISIT, SUBSEQUENT: Primary | ICD-10-CM

## 2024-03-14 DIAGNOSIS — R03.0 ELEVATED BLOOD PRESSURE READING: ICD-10-CM

## 2024-03-14 DIAGNOSIS — R09.89 OTHER SPECIFIED SYMPTOMS AND SIGNS INVOLVING THE CIRCULATORY AND RESPIRATORY SYSTEMS: ICD-10-CM

## 2024-03-14 DIAGNOSIS — R35.1 BENIGN PROSTATIC HYPERPLASIA WITH NOCTURIA: ICD-10-CM

## 2024-03-14 DIAGNOSIS — G44.59 OTHER COMPLICATED HEADACHE SYNDROME: ICD-10-CM

## 2024-03-14 DIAGNOSIS — K21.9 GASTROESOPHAGEAL REFLUX DISEASE, UNSPECIFIED WHETHER ESOPHAGITIS PRESENT: ICD-10-CM

## 2024-03-14 LAB
BILIRUB BLD-MCNC: NEGATIVE MG/DL
CLARITY, POC: CLEAR
COLOR UR: YELLOW
EXPIRATION DATE: NORMAL
GLUCOSE UR STRIP-MCNC: NEGATIVE MG/DL
KETONES UR QL: NEGATIVE
LEUKOCYTE EST, POC: NEGATIVE
Lab: NORMAL
NITRITE UR-MCNC: NEGATIVE MG/ML
PH UR: 6 [PH] (ref 5–8)
PROT UR STRIP-MCNC: NEGATIVE MG/DL
RBC # UR STRIP: NEGATIVE /UL
SP GR UR: 1.01 (ref 1–1.03)
UROBILINOGEN UR QL: NORMAL

## 2024-03-14 RX ORDER — TAMSULOSIN HYDROCHLORIDE 0.4 MG/1
1 CAPSULE ORAL NIGHTLY
Qty: 30 CAPSULE | Refills: 0 | Status: SHIPPED | OUTPATIENT
Start: 2024-03-14

## 2024-03-14 RX ORDER — OMEPRAZOLE 20 MG/1
20 CAPSULE, DELAYED RELEASE ORAL DAILY
Qty: 90 CAPSULE | Refills: 3 | Status: SHIPPED | OUTPATIENT
Start: 2024-03-14

## 2024-03-14 RX ORDER — ATORVASTATIN CALCIUM 20 MG/1
20 TABLET, FILM COATED ORAL EVERY EVENING
Qty: 90 TABLET | Refills: 3 | Status: SHIPPED | OUTPATIENT
Start: 2024-03-14

## 2024-03-14 NOTE — PROGRESS NOTES
The ABCs of the Annual Wellness Visit  Subsequent Medicare Wellness Visit    Subjective    Claude Lucas is a 73 y.o. male who presents for a Subsequent Medicare Wellness Visit.    The following portions of the patient's history were reviewed and   updated as appropriate: allergies, current medications, past family history, past medical history, past social history, past surgical history, and problem list.    Compared to one year ago, the patient feels his physical   health is the same. More aches and pains.     Compared to one year ago, the patient feels his mental   health is better.    Recent Hospitalizations:  He was not admitted to the hospital during the last year.       Current Medical Providers:  Patient Care Team:  Monica Yun MD as PCP - General (Family Medicine)  Chandrakant Carr MD as Consulting Physician (Gastroenterology)  Barbra Foreman MD as Consulting Physician (Dermatology)    Outpatient Medications Prior to Visit   Medication Sig Dispense Refill    fexofenadine (ALLEGRA) 180 MG tablet Take 1 tablet by mouth Daily. Pt states that he only takes this in the spring time.      fluticasone (FLONASE) 50 MCG/ACT nasal spray 2 sprays into the nostril(s) as directed by provider As Needed for Rhinitis.      glucosamine-chondroitin 500-400 MG capsule capsule Take  by mouth 2 (two) times a day.      multivitamin with minerals tablet tablet Take 1 tablet by mouth Daily.      atorvastatin (LIPITOR) 20 MG tablet Take 1 tablet by mouth Every Evening. 90 tablet 3    omeprazole (priLOSEC) 20 MG capsule Take 1 capsule by mouth Daily. 90 capsule 3    sodium-potassium-magnesium sulfates (Suprep Bowel Prep Kit) 17.5-3.13-1.6 GM/177ML solution oral solution Please follow the directions in the letter mailed to you by our office for colonoscopy prep. If you have any questions call our office at [phone]. 354 mL 0     No facility-administered medications prior to visit.       No opioid medication  "identified on active medication list. I have reviewed chart for other potential  high risk medication/s and harmful drug interactions in the elderly.        Aspirin is not on active medication list.  Aspirin use is not indicated based on review of current medical condition/s. Risk of harm outweighs potential benefits.  .    Patient Active Problem List   Diagnosis    Rheumatic mitral regurgitation    Benign prostatic hyperplasia with nocturia    Diverticulosis of large intestine without hemorrhage    Renal stones    Gastroesophageal reflux disease    Schatzki's ring    History of rheumatic fever    Pure hypercholesterolemia    MVP (mitral valve prolapse)    FH: colon polyps    Memory changes    Chronic right hip pain    Palpitations    Ayala's esophagus without dysplasia     Advance Care Planning   Advance Care Planning     Advance Directive is on file.  ACP discussion was held with the patient during this visit. Patient has an advance directive in EMR which is still valid.      Objective    Vitals:    03/14/24 0744   BP: 122/82   Pulse: 57   SpO2: 96%   Weight: 76.8 kg (169 lb 4.8 oz)   Height: 182.9 cm (72.01\")   PainSc: 0-No pain     Estimated body mass index is 22.96 kg/m² as calculated from the following:    Height as of this encounter: 182.9 cm (72.01\").    Weight as of this encounter: 76.8 kg (169 lb 4.8 oz).    BMI is within normal parameters. No other follow-up for BMI required.      Does the patient have evidence of cognitive impairment? No    Lab Results   Component Value Date    TRIG 72 03/11/2024    HDL 50 03/11/2024    LDL 73 03/11/2024    VLDL 14 03/11/2024        HEALTH RISK ASSESSMENT    Smoking Status:  Social History     Tobacco Use   Smoking Status Never    Passive exposure: Never   Smokeless Tobacco Never     Alcohol Consumption:  Social History     Substance and Sexual Activity   Alcohol Use Yes    Alcohol/week: 6.0 standard drinks of alcohol    Types: 3 Glasses of wine, 3 Drinks containing " 0.5 oz of alcohol per week    Comment: occasional     Fall Risk Screen:    CYNTHIA Fall Risk Assessment was completed, and patient is at LOW risk for falls.Assessment completed on:3/14/2024    Depression Screening:      3/14/2024     7:49 AM   PHQ-2/PHQ-9 Depression Screening   Little Interest or Pleasure in Doing Things 0-->not at all   Feeling Down, Depressed or Hopeless 0-->not at all   PHQ-9: Brief Depression Severity Measure Score 0       Health Habits and Functional and Cognitive Screening:      3/14/2024     7:00 AM   Functional & Cognitive Status   Do you have difficulty preparing food and eating? No   Do you have difficulty bathing yourself, getting dressed or grooming yourself? No   Do you have difficulty using the toilet? No   Do you have difficulty moving around from place to place? No   Do you have trouble with steps or getting out of a bed or a chair? No   Current Diet Well Balanced Diet   Dental Exam Up to date   Eye Exam Up to date   Exercise (times per week) 3 times per week   Current Exercises Include Bicycling Outdoors   Do you need help using the phone?  No   Are you deaf or do you have serious difficulty hearing?  No   Do you need help to go to places out of walking distance? No   Do you need help shopping? No   Do you need help preparing meals?  No   Do you need help with housework?  No   Do you need help with laundry? No   Do you need help taking your medications? No   Do you need help managing money? No   Do you ever drive or ride in a car without wearing a seat belt? No   Have you felt unusual stress, anger or loneliness in the last month? No   Who do you live with? Spouse   If you need help, do you have trouble finding someone available to you? No   Have you been bothered in the last four weeks by sexual problems? No   Do you have difficulty concentrating, remembering or making decisions? No       Age-appropriate Screening Schedule:  Refer to the list below for future screening  recommendations based on patient's age, sex and/or medical conditions. Orders for these recommended tests are listed in the plan section. The patient has been provided with a written plan.    Health Maintenance   Topic Date Due    LIPID PANEL  03/11/2025    ANNUAL WELLNESS VISIT  03/14/2025    COLORECTAL CANCER SCREENING  04/26/2028    TDAP/TD VACCINES (3 - Td or Tdap) 02/28/2030    HEPATITIS C SCREENING  Completed    COVID-19 Vaccine  Completed    RSV Vaccine - Adults  Completed    INFLUENZA VACCINE  Completed    Pneumococcal Vaccine 65+  Completed    ZOSTER VACCINE  Completed                Immunization History   Administered Date(s) Administered    ABRYSVO (RSV, 60+ or pregnant women 32-36 wks) 10/06/2023    COVID-19 (PFIZER) BIVALENT 12+YRS 12/21/2022    COVID-19 (PFIZER) Purple Cap Monovalent 03/08/2021, 03/27/2021, 09/08/2021    COVID-19 F23 (PFIZER) 12YRS+ (COMIRNATY) 10/06/2023    Covid-19 (Pfizer) Gray Cap Monovalent 03/31/2022    Fluad Quad 65+ 10/09/2020    Fluzone High Dose =>65 Years ([employer] ONLY) 10/23/2017, 10/11/2018, 10/03/2019    Fluzone High-Dose 65+yrs 09/24/2021, 10/19/2022, 10/19/2023    Influenza, Unspecified 10/06/2023    Pneumococcal Conjugate 13-Valent (PCV13) 11/27/2017    Pneumococcal Conjugate 20-Valent (PCV20) 10/19/2022    Pneumococcal Polysaccharide (PPSV23) 02/22/2019    Shingrix 03/16/2019, 06/04/2019    Tdap 01/01/2010, 02/28/2020    Zostavax 01/01/2015       CMS Preventative Services Quick Reference  Risk Factors Identified During Encounter  Immunizations Discussed/Encouraged: COVID19  The above risks/problems have been discussed with the patient.  Pertinent information has been shared with the patient in the After Visit Summary.  An After Visit Summary and PPPS were made available to the patient.    Follow Up:   Next Medicare Wellness visit to be scheduled in 1 year.       Additional E&M Note during same encounter follows:  Patient has multiple medical problems which are  "significant and separately identifiable that require additional work above and beyond the Medicare Wellness Visit.      Chief Complaint  Medicare wellness (Go over some of his last blood work/Sometimes gets a little twing in chest), Annual Exam, and Urinary Frequency (At night )    Subjective        HPI  Claude Lucas is also being seen today for physical, hyperlipidemia, nocturia    He cycles when weather is good 2-3 times a week.     He has nocturia 2-3 times. During the day he goes frequently. When he has urgency it feel like he have BM. He has periodic sensation incomplete emptying and dribbling.     He denies chest pressure. He has left side chest twinge for 15-20 seconds. He works from home and can hear pulse in his ear/head. BP was high when he had diverticulitis. -148 while exercising. HR comes down., No chest pain with activity.     Bedtime 10 pm and wakes up at 3am, then naps a couple times.     Numbness in bilateral index fingers and get cold even with gloves when outside.     He had right hip pain and catching. Doing better with exercise.     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              Objective   Vital Signs:  /82   Pulse 57   Ht 182.9 cm (72.01\")   Wt 76.8 kg (169 lb 4.8 oz)   SpO2 96%   BMI 22.96 kg/m²     Physical Exam  Constitutional:       General: He is not in acute distress.  HENT:      Right Ear: Tympanic membrane and ear canal normal.      Left Ear: Tympanic membrane and ear canal normal.      Nose: No congestion or rhinorrhea.      Mouth/Throat:      Mouth: Mucous membranes are moist.      Pharynx: No oropharyngeal exudate or posterior oropharyngeal erythema.   Eyes:      General:         Right eye: No discharge.         Left eye: No discharge.      Conjunctiva/sclera: Conjunctivae normal.      Comments: Wears glasses   Neck:      Thyroid: No thyromegaly.   Cardiovascular:      Rate and Rhythm: Normal rate and regular rhythm.   Pulmonary:      Effort: Pulmonary effort is " normal.      Breath sounds: Normal breath sounds.   Abdominal:      Palpations: Abdomen is soft. There is no hepatomegaly.      Tenderness: There is no abdominal tenderness.   Genitourinary:     Prostate: Enlarged. Not tender and no nodules present.   Musculoskeletal:      Cervical back: Neck supple.   Lymphadenopathy:      Head:      Right side of head: No submandibular, preauricular or posterior auricular adenopathy.      Left side of head: No submandibular, preauricular or posterior auricular adenopathy.      Cervical: No cervical adenopathy.   Skin:     General: Skin is warm.   Neurological:      Mental Status: He is alert and oriented to person, place, and time.   Psychiatric:         Mood and Affect: Mood normal.         Behavior: Behavior normal.         Thought Content: Thought content normal.         Judgment: Judgment normal.          The following data was reviewed by: Monica Yun MD on 03/14/2024:  Common labs          3/11/2024    08:44   Common Labs   Glucose 90    BUN 18    Creatinine 1.09    Sodium 146    Potassium 4.3    Chloride 107    Calcium 9.5    Albumin 4.5    Total Bilirubin 0.9    Alkaline Phosphatase 86    AST (SGOT) 26    ALT (SGPT) 23    WBC 5.48    Hemoglobin 15.4    Hematocrit 47.2    Platelets 238    Total Cholesterol 137    Triglycerides 72    HDL Cholesterol 50    LDL Cholesterol  73    PSA 0.923      PSA          3/11/2024    08:44   PSA   PSA 0.923              Results for orders placed or performed in visit on 03/14/24   POC Urinalysis Dipstick, Automated    Specimen: Urine   Result Value Ref Range    Color Yellow Yellow, Straw, Dark Yellow, Yanet    Clarity, UA Clear Clear    Specific Gravity  1.015 1.005 - 1.030    pH, Urine 6.0 5.0 - 8.0    Leukocytes Negative Negative    Nitrite, UA Negative Negative    Protein, POC Negative Negative mg/dL    Glucose, UA Negative Negative mg/dL    Ketones, UA Negative Negative    Urobilinogen, UA Normal Normal, 0.2 E.U./dL    Bilirubin  Negative Negative    Blood, UA Negative Negative    Lot Number [id number]     Expiration Date 01/14/2025           Assessment and Plan   Diagnoses and all orders for this visit:    1. Medicare annual wellness visit, subsequent (Primary)  Reviewed previsit fasting labs with patient in detail.  Discussed COVID-19 booster.  Colon cancer screening colonoscopy up-to-date.  2. Pure hypercholesterolemia  -     atorvastatin (LIPITOR) 20 MG tablet; Take 1 tablet by mouth Every Evening.  Dispense: 90 tablet; Refill: 3  Stable.  3. Gastroesophageal reflux disease, unspecified whether esophagitis present  -     omeprazole (priLOSEC) 20 MG capsule; Take 1 capsule by mouth Daily.  Dispense: 90 capsule; Refill: 3  Stable  4. Benign prostatic hyperplasia with nocturia  -     POC Urinalysis Dipstick, Automated  -     tamsulosin (FLOMAX) 0.4 MG capsule 24 hr capsule; Take 1 capsule by mouth Every Night.  Dispense: 30 capsule; Refill: 0  Worse. Start tamsulosin.  Monitor for side effects such as dizziness.  If improving I will send in a 90-day supply.    5. Elevated blood pressure reading  -     Duplex Carotid Ultrasound CAR; Future  Blood pressure improved on this visit  6. Other complicated headache syndrome  -     Duplex Carotid Ultrasound CAR; Future    7. Other specified symptoms and signs involving the circulatory and respiratory systems  -     Duplex Carotid Ultrasound CAR; Future             Follow Up   Return in about 1 year (around 3/15/2025) for MWV and fasting labs.  Patient was given instructions and counseling regarding his condition or for health maintenance advice. Please see specific information pulled into the AVS if appropriate.         Electronically signed by Monica Yun MD, 03/14/24, 8:20 AM EDT.

## 2024-03-14 NOTE — PATIENT INSTRUCTIONS
Medicare Wellness  Personal Prevention Plan of Service     Date of Office Visit:    Encounter Provider:  Monica Yun MD  Place of Service:  [facility]  Patient Name: Claude Lucas  :  1950    As part of the Medicare Wellness portion of your visit today, we are providing you with this personalized preventive plan of services (PPPS). This plan is based upon recommendations of the [place] Preventive Services Task Force (USPSTF) and the Advisory Committee on Immunization Practices (ACIP).    This lists the preventive care services that should be considered, and provides dates of when you are due. Items listed as completed are up-to-date and do not require any further intervention.    Health Maintenance   Topic Date Due   • ANNUAL WELLNESS VISIT  2024   • LIPID PANEL  2025   • COLORECTAL CANCER SCREENING  2028   • TDAP/TD VACCINES (3 - Td or Tdap) 2030   • HEPATITIS C SCREENING  Completed   • COVID-19 Vaccine  Completed   • RSV Vaccine - Adults  Completed   • INFLUENZA VACCINE  Completed   • Pneumococcal Vaccine 65+  Completed   • ZOSTER VACCINE  Completed       No orders of the defined types were placed in this encounter.      No follow-ups on file.

## 2024-03-27 ENCOUNTER — HOSPITAL ENCOUNTER (OUTPATIENT)
Dept: CARDIOLOGY | Facility: HOSPITAL | Age: 74
Discharge: HOME | End: 2024-03-27
Admitting: FAMILY MEDICINE
Payer: MEDICARE

## 2024-03-27 ENCOUNTER — PATIENT MESSAGE (OUTPATIENT)
Age: 74
End: 2024-03-27
Payer: MEDICARE

## 2024-03-27 VITALS — HEIGHT: 72 IN | WEIGHT: 169.31 LBS | BODY MASS INDEX: 22.93 KG/M2

## 2024-03-27 DIAGNOSIS — R09.89 OTHER SPECIFIED SYMPTOMS AND SIGNS INVOLVING THE CIRCULATORY AND RESPIRATORY SYSTEMS: ICD-10-CM

## 2024-03-27 DIAGNOSIS — R03.0 ELEVATED BLOOD PRESSURE READING: ICD-10-CM

## 2024-03-27 DIAGNOSIS — G44.59 OTHER COMPLICATED HEADACHE SYNDROME: ICD-10-CM

## 2024-03-27 LAB
BH CV XLRA MEAS LEFT DIST CCA EDV: 27.4 CM/SEC
BH CV XLRA MEAS LEFT DIST CCA PSV: 93 CM/SEC
BH CV XLRA MEAS LEFT DIST ICA EDV: 39.1 CM/SEC
BH CV XLRA MEAS LEFT DIST ICA PSV: 109.4 CM/SEC
BH CV XLRA MEAS LEFT ICA/CCA RATIO: 0.91
BH CV XLRA MEAS LEFT MID CCA EDV: 30.7 CM/SEC
BH CV XLRA MEAS LEFT MID CCA PSV: 110.3 CM/SEC
BH CV XLRA MEAS LEFT MID ICA EDV: 39.1 CM/SEC
BH CV XLRA MEAS LEFT MID ICA PSV: 100 CM/SEC
BH CV XLRA MEAS LEFT PROX CCA EDV: 33 CM/SEC
BH CV XLRA MEAS LEFT PROX CCA PSV: 152.6 CM/SEC
BH CV XLRA MEAS LEFT PROX ECA EDV: 13.4 CM/SEC
BH CV XLRA MEAS LEFT PROX ECA PSV: 102.4 CM/SEC
BH CV XLRA MEAS LEFT PROX ICA EDV: 34.4 CM/SEC
BH CV XLRA MEAS LEFT PROX ICA PSV: 97.7 CM/SEC
BH CV XLRA MEAS LEFT PROX SCLA PSV: 160.5 CM/SEC
BH CV XLRA MEAS LEFT VERTEBRAL A EDV: 12.4 CM/SEC
BH CV XLRA MEAS LEFT VERTEBRAL A PSV: 59.1 CM/SEC
BH CV XLRA MEAS RIGHT DIST CCA EDV: 34.9 CM/SEC
BH CV XLRA MEAS RIGHT DIST CCA PSV: 91.1 CM/SEC
BH CV XLRA MEAS RIGHT DIST ICA EDV: 36.1 CM/SEC
BH CV XLRA MEAS RIGHT DIST ICA PSV: 115.5 CM/SEC
BH CV XLRA MEAS RIGHT ICA/CCA RATIO: 0.9
BH CV XLRA MEAS RIGHT MID CCA EDV: 32.4 CM/SEC
BH CV XLRA MEAS RIGHT MID CCA PSV: 126.4 CM/SEC
BH CV XLRA MEAS RIGHT MID ICA EDV: 40.4 CM/SEC
BH CV XLRA MEAS RIGHT MID ICA PSV: 96.2 CM/SEC
BH CV XLRA MEAS RIGHT PROX CCA EDV: 24.4 CM/SEC
BH CV XLRA MEAS RIGHT PROX CCA PSV: 136.8 CM/SEC
BH CV XLRA MEAS RIGHT PROX ECA EDV: 21 CM/SEC
BH CV XLRA MEAS RIGHT PROX ECA PSV: 113.4 CM/SEC
BH CV XLRA MEAS RIGHT PROX ICA EDV: 33.4 CM/SEC
BH CV XLRA MEAS RIGHT PROX ICA PSV: 114 CM/SEC
BH CV XLRA MEAS RIGHT PROX SCLA PSV: 136.9 CM/SEC
BH CV XLRA MEAS RIGHT VERTEBRAL A EDV: 15.9 CM/SEC
BH CV XLRA MEAS RIGHT VERTEBRAL A PSV: 76 CM/SEC
LEFT ARM BP: NORMAL MMHG
RIGHT ARM BP: NORMAL MMHG

## 2024-03-27 PROCEDURE — 93880 EXTRACRANIAL BILAT STUDY: CPT

## 2024-04-10 DIAGNOSIS — R35.1 BENIGN PROSTATIC HYPERPLASIA WITH NOCTURIA: ICD-10-CM

## 2024-04-10 DIAGNOSIS — N40.1 BENIGN PROSTATIC HYPERPLASIA WITH NOCTURIA: ICD-10-CM

## 2024-04-10 RX ORDER — TAMSULOSIN HYDROCHLORIDE 0.4 MG/1
1 CAPSULE ORAL NIGHTLY
Qty: 30 CAPSULE | Refills: 0 | Status: SHIPPED | OUTPATIENT
Start: 2024-04-10

## 2024-04-10 NOTE — TELEPHONE ENCOUNTER
Rx Refill Note  Requested Prescriptions     Pending Prescriptions Disp Refills    tamsulosin (FLOMAX) 0.4 MG capsule 24 hr capsule [Pharmacy Med Name: TAMSULOSIN HCL 0.4 MG CAPSULE] 30 capsule 0     Sig: TAKE ONE CAPSULE BY MOUTH ONCE NIGHTLY      Last office visit with prescribing clinician: 3/14/2024   Last telemedicine visit with prescribing clinician: Visit date not found   Next office visit with prescribing clinician: 3/18/2025                         Would you like a call back once the refill request has been completed: [] Yes [] No    If the office needs to give you a call back, can they leave a voicemail: [] Yes [] No    Amber Pérez MA  04/10/24, 08:14 EDT

## 2024-07-08 ENCOUNTER — LAB (OUTPATIENT)
Age: 74
End: 2024-07-08
Payer: MEDICARE

## 2024-07-08 ENCOUNTER — OFFICE VISIT (OUTPATIENT)
Age: 74
End: 2024-07-08
Payer: MEDICARE

## 2024-07-08 VITALS
HEART RATE: 58 BPM | WEIGHT: 168 LBS | BODY MASS INDEX: 22.75 KG/M2 | SYSTOLIC BLOOD PRESSURE: 142 MMHG | OXYGEN SATURATION: 97 % | RESPIRATION RATE: 16 BRPM | HEIGHT: 72 IN | DIASTOLIC BLOOD PRESSURE: 88 MMHG

## 2024-07-08 DIAGNOSIS — I34.1 MVP (MITRAL VALVE PROLAPSE): ICD-10-CM

## 2024-07-08 DIAGNOSIS — R03.0 ELEVATED BLOOD PRESSURE READING WITHOUT DIAGNOSIS OF HYPERTENSION: ICD-10-CM

## 2024-07-08 DIAGNOSIS — R53.83 OTHER FATIGUE: ICD-10-CM

## 2024-07-08 DIAGNOSIS — R53.83 FATIGUE, UNSPECIFIED TYPE: Primary | ICD-10-CM

## 2024-07-08 DIAGNOSIS — Z86.79 HISTORY OF RHEUMATIC FEVER: Chronic | ICD-10-CM

## 2024-07-08 DIAGNOSIS — R07.89 OTHER CHEST PAIN: Primary | ICD-10-CM

## 2024-07-08 DIAGNOSIS — I05.1 RHEUMATIC MITRAL REGURGITATION: Chronic | ICD-10-CM

## 2024-07-08 LAB
ALBUMIN SERPL-MCNC: 4.3 G/DL (ref 3.5–5.2)
ALBUMIN/GLOB SERPL: 1.7 G/DL
ALP SERPL-CCNC: 75 U/L (ref 39–117)
ALT SERPL W P-5'-P-CCNC: 23 U/L (ref 1–41)
ANION GAP SERPL CALCULATED.3IONS-SCNC: 10 MMOL/L (ref 5–15)
AST SERPL-CCNC: 29 U/L (ref 1–40)
BASOPHILS # BLD AUTO: 0.06 10*3/MM3 (ref 0–0.2)
BASOPHILS NFR BLD AUTO: 1.2 % (ref 0–1.5)
BILIRUB SERPL-MCNC: 1 MG/DL (ref 0–1.2)
BUN SERPL-MCNC: 19 MG/DL (ref 8–23)
BUN/CREAT SERPL: 17.9 (ref 7–25)
CALCIUM SPEC-SCNC: 9.5 MG/DL (ref 8.6–10.5)
CHLORIDE SERPL-SCNC: 105 MMOL/L (ref 98–107)
CO2 SERPL-SCNC: 27 MMOL/L (ref 22–29)
CREAT SERPL-MCNC: 1.06 MG/DL (ref 0.76–1.27)
DEPRECATED RDW RBC AUTO: 44.1 FL (ref 37–54)
EGFRCR SERPLBLD CKD-EPI 2021: 74.6 ML/MIN/1.73
EOSINOPHIL # BLD AUTO: 0.2 10*3/MM3 (ref 0–0.4)
EOSINOPHIL NFR BLD AUTO: 4 % (ref 0.3–6.2)
ERYTHROCYTE [DISTWIDTH] IN BLOOD BY AUTOMATED COUNT: 12.6 % (ref 12.3–15.4)
GLOBULIN UR ELPH-MCNC: 2.5 GM/DL
GLUCOSE SERPL-MCNC: 87 MG/DL (ref 65–99)
HCT VFR BLD AUTO: 42.9 % (ref 37.5–51)
HGB BLD-MCNC: 14.3 G/DL (ref 13–17.7)
IMM GRANULOCYTES # BLD AUTO: 0.02 10*3/MM3 (ref 0–0.05)
IMM GRANULOCYTES NFR BLD AUTO: 0.4 % (ref 0–0.5)
LYMPHOCYTES # BLD AUTO: 1.45 10*3/MM3 (ref 0.7–3.1)
LYMPHOCYTES NFR BLD AUTO: 29.3 % (ref 19.6–45.3)
MCH RBC QN AUTO: 32 PG (ref 26.6–33)
MCHC RBC AUTO-ENTMCNC: 33.3 G/DL (ref 31.5–35.7)
MCV RBC AUTO: 96 FL (ref 79–97)
MONOCYTES # BLD AUTO: 0.51 10*3/MM3 (ref 0.1–0.9)
MONOCYTES NFR BLD AUTO: 10.3 % (ref 5–12)
NEUTROPHILS NFR BLD AUTO: 2.71 10*3/MM3 (ref 1.7–7)
NEUTROPHILS NFR BLD AUTO: 54.8 % (ref 42.7–76)
NRBC BLD AUTO-RTO: 0 /100 WBC (ref 0–0.2)
PLATELET # BLD AUTO: 204 10*3/MM3 (ref 140–450)
PMV BLD AUTO: 11.2 FL (ref 6–12)
POTASSIUM SERPL-SCNC: 4.1 MMOL/L (ref 3.5–5.2)
PROT SERPL-MCNC: 6.8 G/DL (ref 6–8.5)
RBC # BLD AUTO: 4.47 10*6/MM3 (ref 4.14–5.8)
SODIUM SERPL-SCNC: 142 MMOL/L (ref 136–145)
TSH SERPL DL<=0.05 MIU/L-ACNC: 3.26 UIU/ML (ref 0.27–4.2)
VIT B12 BLD-MCNC: 360 PG/ML (ref 211–946)
WBC NRBC COR # BLD AUTO: 4.95 10*3/MM3 (ref 3.4–10.8)

## 2024-07-08 PROCEDURE — 1160F RVW MEDS BY RX/DR IN RCRD: CPT | Performed by: FAMILY MEDICINE

## 2024-07-08 PROCEDURE — 1126F AMNT PAIN NOTED NONE PRSNT: CPT | Performed by: FAMILY MEDICINE

## 2024-07-08 PROCEDURE — 99214 OFFICE O/P EST MOD 30 MIN: CPT | Performed by: FAMILY MEDICINE

## 2024-07-08 PROCEDURE — 93000 ELECTROCARDIOGRAM COMPLETE: CPT | Performed by: FAMILY MEDICINE

## 2024-07-08 PROCEDURE — 82607 VITAMIN B-12: CPT | Performed by: FAMILY MEDICINE

## 2024-07-08 PROCEDURE — 85025 COMPLETE CBC W/AUTO DIFF WBC: CPT | Performed by: FAMILY MEDICINE

## 2024-07-08 PROCEDURE — 36415 COLL VENOUS BLD VENIPUNCTURE: CPT | Performed by: FAMILY MEDICINE

## 2024-07-08 PROCEDURE — 80053 COMPREHEN METABOLIC PANEL: CPT | Performed by: FAMILY MEDICINE

## 2024-07-08 PROCEDURE — 1159F MED LIST DOCD IN RCRD: CPT | Performed by: FAMILY MEDICINE

## 2024-07-08 PROCEDURE — 84443 ASSAY THYROID STIM HORMONE: CPT | Performed by: FAMILY MEDICINE

## 2024-07-08 NOTE — PROGRESS NOTES
"Chief Complaint  Fatigue and Anxiety    Subjective        Claude Lucas presents to [facility]  Fatigue  Associated symptoms include fatigue.   Anxiety    Answers submitted by the patient for this visit:  Primary Reason for Visit (Submitted on 7/6/2024)  What is the primary reason for your visit?: Other  Other (Submitted on 7/6/2024)  Please describe your symptoms.: some lethargy, sometimes feel like heart skipped a beat, can get winded with some activity, but able to do bike rides normally done.  slight dizziness, and a few times odd feeling surges through me.  Have you had these symptoms before?: No  How long have you been having these symptoms?: 5-7 days  Please list any medications you are currently taking for this condition.: none  Please describe any probable cause for these symptoms. : not sure if there are heart/circulation issues or chemical imbalance?    No h/o hypertension. He is not feeling right, but not horrible. Started after taking tamsulosin in March. He was dizzy and lethargic and stopped after 1 week, also didn't help prostate. He used OTC medication for prostate health for 2 weeks and didn't change. Last week after bike ride feeling lightheaded and lethargic. Stopped OTC supplement. Slightly better over 2-3 days. He rode 20 miles on bike ride 2 days ago. He still has lightheaded. No chest pain but he has pressure/ ache around left heart. He feel his heart skip a beat or needs to take a deep breath. He has anxiety. Stepbrother with heart issues is having pacemaker placed. Friends also have heart problems. Currently no ligthheaded but on the way to appointment he did along with tiny chest pain. No current chest pain.               Objective   Vital Signs:  /88 (BP Location: Right arm, Patient Position: Sitting, Cuff Size: Adult)   Pulse 58   Resp 16   Ht 182.9 cm (72\")   Wt 76.2 kg (168 lb)   SpO2 97%   BMI 22.78 kg/m²   Estimated body mass index is " "22.78 kg/m² as calculated from the following:    Height as of this encounter: 182.9 cm (72\").    Weight as of this encounter: 76.2 kg (168 lb).       BMI is within normal parameters. No other follow-up for BMI required.      Physical Exam  Vitals reviewed.   Constitutional:       General: He is not in acute distress.     Appearance: He is not ill-appearing, toxic-appearing or diaphoretic.   Neck:      Thyroid: No thyroid mass, thyromegaly or thyroid tenderness.   Cardiovascular:      Rate and Rhythm: Regular rhythm. Bradycardia present.   Pulmonary:      Effort: Pulmonary effort is normal.      Breath sounds: Normal breath sounds.   Abdominal:      General: Abdomen is flat.      Palpations: Abdomen is soft. There is no hepatomegaly or splenomegaly.      Tenderness: There is no abdominal tenderness.   Skin:     Coloration: Skin is not pale.   Neurological:      Mental Status: He is alert.      Gait: Gait normal.   Psychiatric:         Mood and Affect: Mood normal.        Result Review :    The following data was reviewed by: Monica Yun MD on 07/08/2024:  Common labs          3/11/2024    08:44   Common Labs   Glucose 90    BUN 18    Creatinine 1.09    Sodium 146    Potassium 4.3    Chloride 107    Calcium 9.5    Albumin 4.5    Total Bilirubin 0.9    Alkaline Phosphatase 86    AST (SGOT) 26    ALT (SGPT) 23    WBC 5.48    Hemoglobin 15.4    Hematocrit 47.2    Platelets 238    Total Cholesterol 137    Triglycerides 72    HDL Cholesterol 50    LDL Cholesterol  73    PSA 0.923          ECG 12 Lead (12/20/2020 13:36)     ECG 12 Lead    Date/Time: 7/8/2024 8:37 AM  Performed by: Monica Yun MD    Authorized by: Monica Yun MD  Comparison: compared with previous ECG from 12/20/2020  Similar to previous ECG  Rhythm: sinus bradycardia  Rate: bradycardic  BPM: 50  ST Segments: ST segments normal  T Waves: T waves normal  QRS axis: normal    Clinical impression: normal ECG            Assessment and Plan "     Diagnoses and all orders for this visit:    1. Other chest pain (Primary)  -     ECG 12 Lead  -     Ambulatory Referral to Cardiology    2. Other fatigue  -     Comprehensive Metabolic Panel; Future  -     CBC Auto Differential; Future  -     TSH Rfx On Abnormal To Free T4; Future  -     Vitamin B12; Future  -     Ambulatory Referral to Cardiology    3. Rheumatic mitral regurgitation  -     Ambulatory Referral to Cardiology    4. History of rheumatic fever  -     Ambulatory Referral to Cardiology    5. MVP (mitral valve prolapse)  -     Ambulatory Referral to Cardiology    6. Elevated blood pressure reading without diagnosis of hypertension  -     Ambulatory Referral to Cardiology      EKG in the office without acute ST changes and no current chest pain.  With multiple symptoms and age he needs further cardiac workup.  Refer to cardiology.  Check labs today.         Follow Up     Return if symptoms worsen or fail to improve.  Patient was given instructions and counseling regarding his condition or for health maintenance advice. Please see specific information pulled into the AVS if appropriate.     Electronically signed by Monica Yun MD, 07/08/24, 8:42 AM EDT.

## 2024-07-15 NOTE — PROGRESS NOTES
"[facility]  Heart and Valve Clinic    PCP: Monica Yun MD    Referring provider: Monica Yun MD      Chief Complaint  Chest Pain    Subjective      History of Present Illness     Claude Lucas, 74 y.o. male with mitral valve prolapse,  palpitations, hyperlipidemia, GERD who presents today for Chest Pain    He notes some intermittent left sided chest pressure for awhile, but 2 weeks ago it seemed a little more persistent. He is a cyclist and reports that he has had multiple recent friends have cardiac issues and wonders if he has anxiety. He rides his bike over 20 miles twice a week without exertional symptoms. He notes occasional orthostatic lightheadedness. He occasionally will feel the need to take a deep breath but doesn't feel short of breath. He says it could be a flutter, but is not aware of regular palpitations    He has a history of mitral valve prolapse. Questionable scarlet fever vs rheumatic fever as a child.     Cardiac Risks: hyperlipidemia, family hx (father has CABG in his 60s, brother had VHD-MVP, endocarditis and SCD 64 years), age, gender    Objective     Vital Signs:   Vitals:    07/16/24 0801 07/16/24 0802   BP: 145/75 147/75   BP Location: Left arm Left arm   Patient Position: Sitting Standing   Pulse: 58 67   SpO2: 93%    Weight: 76.2 kg (168 lb)    Height: 182.9 cm (72\")      Body mass index is 22.78 kg/m².  Physical Exam  Vitals reviewed.   Constitutional:       Appearance: Normal appearance.   HENT:      Head: Normocephalic.   Neck:      Vascular: No carotid bruit.   Cardiovascular:      Rate and Rhythm: Normal rate and regular rhythm.      Pulses: Normal pulses.      Heart sounds: Normal heart sounds, S1 normal and S2 normal. No murmur heard.  Pulmonary:      Effort: Pulmonary effort is normal. No respiratory distress.      Breath sounds: Normal breath sounds.   Chest:      Chest wall: No tenderness.   Abdominal:      General: Abdomen is flat.      " Palpations: Abdomen is soft.   Musculoskeletal:      Cervical back: Neck supple.      Right lower leg: No edema.      Left lower leg: No edema.   Skin:     General: Skin is warm and dry.   Neurological:      General: No focal deficit present.      Mental Status: He is alert and oriented to person, place, and time. Mental status is at baseline.   Psychiatric:         Mood and Affect: Mood normal.         Behavior: Behavior normal.         Thought Content: Thought content normal.                Data Reviewed:{ Labs  Result Review  Imaging  Med Tab  Media :23}   Vitamin B12 (07/08/2024 08:46)  TSH Rfx On Abnormal To Free T4 (07/08/2024 08:46)  CBC Auto Differential (07/08/2024 08:46)  Comprehensive Metabolic Panel (07/08/2024 08:46)  Lipid Panel (03/11/2024 08:44)  ECG Scan (07/08/2024)     Assessment & Plan   Assessment and Plan    1. Chest pain, unspecified type  - New left-sided chest pressure.  Due to multiple ASCVD risks we will proceed with ischemic evaluation  - Adult Transthoracic Echo Complete W/ Cont if Necessary Per Protocol; Future  - Stress Test With Myocardial Perfusion (1 Day); Future    2. Mitral Valve Prolapse    - Adult Transthoracic Echo Complete W/ Cont if Necessary Per Protocol; Future  - Stress Test With Myocardial Perfusion (1 Day); Future    3. Elevated blood pressure reading  -He is going to get a home blood pressure device to start monitoring his blood pressure at home.  He will present readings to follow-up  - Adult Transthoracic Echo Complete W/ Cont if Necessary Per Protocol; Future  - Stress Test With Myocardial Perfusion (1 Day); Future    4. Hyperlipidemia  - Last LDL 73  - Continue atorvastatin 20mg daily        Follow Up   Return in about 4 weeks (around 8/13/2024) for HTN, Video Visit.          Patient was given instructions and counseling regarding his condition or for health maintenance advice. Please see specific information pulled into the AVS if appropriate.  Patient was  instructed to call the [facility] with any questions, concerns, or worsening symptoms.    Dictated Utilizing Dragon Dictation   Please note that portions of this note were completed with a voice recognition program.   Part of this note may be an electronic transcription/translation of spoken language to printed text using the Dragon Dictation System.

## 2024-07-16 ENCOUNTER — PATIENT ROUNDING (BHMG ONLY) (OUTPATIENT)
Dept: CARDIOLOGY | Facility: HOSPITAL | Age: 74
End: 2024-07-16
Payer: MEDICARE

## 2024-07-16 ENCOUNTER — OFFICE VISIT (OUTPATIENT)
Dept: CARDIOLOGY | Facility: HOSPITAL | Age: 74
End: 2024-07-16
Payer: MEDICARE

## 2024-07-16 VITALS
WEIGHT: 168 LBS | HEART RATE: 67 BPM | HEIGHT: 72 IN | SYSTOLIC BLOOD PRESSURE: 147 MMHG | OXYGEN SATURATION: 93 % | BODY MASS INDEX: 22.75 KG/M2 | DIASTOLIC BLOOD PRESSURE: 75 MMHG

## 2024-07-16 DIAGNOSIS — I34.1 MITRAL VALVE PROLAPSE: ICD-10-CM

## 2024-07-16 DIAGNOSIS — R03.0 ELEVATED BLOOD PRESSURE READING: ICD-10-CM

## 2024-07-16 DIAGNOSIS — E78.5 HYPERLIPIDEMIA, UNSPECIFIED HYPERLIPIDEMIA TYPE: ICD-10-CM

## 2024-07-16 DIAGNOSIS — R07.9 CHEST PAIN, UNSPECIFIED TYPE: Primary | ICD-10-CM

## 2024-07-16 NOTE — PROGRESS NOTES
July 16, 2024    Hello, may I speak with Claude Lucas?    My name is Sulma      I am  with [facility]  [address]  [facility] [address] [phone].    Before we get started may I verify your date of birth? 1950    I am calling to officially welcome you to our practice and ask about your recent visit. Is this a good time to talk? yes    Tell me about your visit with us. What things went well?  Everything went well. I am very pleased. Starr Jacobsen was very helpful. You were too Sulma.        We're always looking for ways to make our patients' experiences even better. Do you have recommendations on ways we may improve?  no    Overall were you satisfied with your first visit to our practice? yes       I appreciate you taking the time to speak with me today. Is there anything else I can do for you? no      Thank you, and have a great day.

## 2024-07-19 ENCOUNTER — HOSPITAL ENCOUNTER (OUTPATIENT)
Dept: CARDIOLOGY | Facility: HOSPITAL | Age: 74
Discharge: HOME | End: 2024-07-19
Payer: MEDICARE

## 2024-07-19 DIAGNOSIS — I34.1 MITRAL VALVE PROLAPSE: ICD-10-CM

## 2024-07-19 DIAGNOSIS — R07.9 CHEST PAIN, UNSPECIFIED TYPE: ICD-10-CM

## 2024-07-19 DIAGNOSIS — R03.0 ELEVATED BLOOD PRESSURE READING: ICD-10-CM

## 2024-07-19 PROCEDURE — 93306 TTE W/DOPPLER COMPLETE: CPT

## 2024-07-22 LAB
ASCENDING AORTA: 3.9 CM
BH CV ECHO MEAS - AO MAX PG: 5.8 MMHG
BH CV ECHO MEAS - AO MEAN PG: 3 MMHG
BH CV ECHO MEAS - AO ROOT DIAM: 3.3 CM
BH CV ECHO MEAS - AO V2 MAX: 120 CM/SEC
BH CV ECHO MEAS - AO V2 VTI: 25.3 CM
BH CV ECHO MEAS - AVA(I,D): 3.7 CM2
BH CV ECHO MEAS - EDV(CUBED): 91.1 ML
BH CV ECHO MEAS - EDV(MOD-SP2): 64.3 ML
BH CV ECHO MEAS - EDV(MOD-SP4): 80.1 ML
BH CV ECHO MEAS - EF(MOD-BP): 57.3 %
BH CV ECHO MEAS - EF(MOD-SP2): 53.5 %
BH CV ECHO MEAS - EF(MOD-SP4): 60.3 %
BH CV ECHO MEAS - ESV(CUBED): 27 ML
BH CV ECHO MEAS - ESV(MOD-SP2): 29.9 ML
BH CV ECHO MEAS - ESV(MOD-SP4): 31.8 ML
BH CV ECHO MEAS - FS: 33.3 %
BH CV ECHO MEAS - IVS/LVPW: 0.9 CM
BH CV ECHO MEAS - IVSD: 0.9 CM
BH CV ECHO MEAS - LA DIMENSION: 3.6 CM
BH CV ECHO MEAS - LAT PEAK E' VEL: 10.9 CM/SEC
BH CV ECHO MEAS - LV MASS(C)D: 142.9 GRAMS
BH CV ECHO MEAS - LV MAX PG: 4.1 MMHG
BH CV ECHO MEAS - LV MEAN PG: 2 MMHG
BH CV ECHO MEAS - LV V1 MAX: 101 CM/SEC
BH CV ECHO MEAS - LV V1 VTI: 22.6 CM
BH CV ECHO MEAS - LVIDD: 4.5 CM
BH CV ECHO MEAS - LVIDS: 3 CM
BH CV ECHO MEAS - LVOT AREA: 4.2 CM2
BH CV ECHO MEAS - LVOT DIAM: 2.3 CM
BH CV ECHO MEAS - LVPWD: 1 CM
BH CV ECHO MEAS - MED PEAK E' VEL: 8.7 CM/SEC
BH CV ECHO MEAS - MV A MAX VEL: 69.2 CM/SEC
BH CV ECHO MEAS - MV DEC SLOPE: 456 CM/SEC2
BH CV ECHO MEAS - MV DEC TIME: 0.17 SEC
BH CV ECHO MEAS - MV E MAX VEL: 87.5 CM/SEC
BH CV ECHO MEAS - MV E/A: 1.26
BH CV ECHO MEAS - MV MAX PG: 6.2 MMHG
BH CV ECHO MEAS - MV MEAN PG: 1 MMHG
BH CV ECHO MEAS - MV P1/2T: 73.2 MSEC
BH CV ECHO MEAS - MV V2 VTI: 39.7 CM
BH CV ECHO MEAS - MVA(P1/2T): 3 CM2
BH CV ECHO MEAS - MVA(VTI): 2.37 CM2
BH CV ECHO MEAS - PA ACC TIME: 0.15 SEC
BH CV ECHO MEAS - PI END-D VEL: 80.2 CM/SEC
BH CV ECHO MEAS - RAP SYSTOLE: 8 MMHG
BH CV ECHO MEAS - RVSP: 34 MMHG
BH CV ECHO MEAS - SV(LVOT): 93.9 ML
BH CV ECHO MEAS - SV(MOD-SP2): 34.4 ML
BH CV ECHO MEAS - SV(MOD-SP4): 48.3 ML
BH CV ECHO MEAS - TAPSE (>1.6): 2.29 CM
BH CV ECHO MEAS - TR MAX PG: 25.6 MMHG
BH CV ECHO MEAS - TR MAX VEL: 253 CM/SEC
BH CV ECHO MEASUREMENTS AVERAGE E/E' RATIO: 8.93
BH CV XLRA - RV BASE: 2.8 CM
BH CV XLRA - RV LENGTH: 6.1 CM
BH CV XLRA - RV MID: 2.3 CM
BH CV XLRA - TDI S': 15.9 CM/SEC

## 2024-07-22 NOTE — PROGRESS NOTES
Your echo shows that your heart is squeezing normally.  There is mild mitral valve prolapse with some moderate mitral valve regurgitation.  Overall, this is not a major concern at this time but you will need another echo in about a year.  Will discuss this in more detail at your follow-up.  Please let me know if you have any further questions or concerns

## 2024-07-24 ENCOUNTER — HOSPITAL ENCOUNTER (OUTPATIENT)
Dept: CARDIOLOGY | Facility: HOSPITAL | Age: 74
Discharge: HOME | End: 2024-07-24
Payer: MEDICARE

## 2024-07-24 VITALS
BODY MASS INDEX: 22.75 KG/M2 | DIASTOLIC BLOOD PRESSURE: 84 MMHG | OXYGEN SATURATION: 93 % | WEIGHT: 167.99 LBS | HEART RATE: 51 BPM | HEIGHT: 72 IN | SYSTOLIC BLOOD PRESSURE: 162 MMHG

## 2024-07-24 DIAGNOSIS — R07.9 CHEST PAIN, UNSPECIFIED TYPE: ICD-10-CM

## 2024-07-24 DIAGNOSIS — I34.1 MITRAL VALVE PROLAPSE: ICD-10-CM

## 2024-07-24 DIAGNOSIS — R03.0 ELEVATED BLOOD PRESSURE READING: ICD-10-CM

## 2024-07-24 LAB
BH CV REST NUCLEAR ISOTOPE DOSE: 9.9 MCI
BH CV STRESS BP STAGE 1: NORMAL
BH CV STRESS BP STAGE 2: NORMAL
BH CV STRESS DURATION MIN STAGE 1: 3
BH CV STRESS DURATION MIN STAGE 2: 3
BH CV STRESS DURATION MIN STAGE 3: 2
BH CV STRESS DURATION SEC STAGE 1: 0
BH CV STRESS DURATION SEC STAGE 2: 0
BH CV STRESS DURATION SEC STAGE 3: 1
BH CV STRESS GRADE STAGE 1: 10
BH CV STRESS GRADE STAGE 2: 12
BH CV STRESS GRADE STAGE 3: 14
BH CV STRESS HR STAGE 1: 111
BH CV STRESS HR STAGE 2: 134
BH CV STRESS HR STAGE 3: 142
BH CV STRESS METS STAGE 1: 5
BH CV STRESS METS STAGE 2: 7.5
BH CV STRESS METS STAGE 3: 10
BH CV STRESS NUCLEAR ISOTOPE DOSE: 32.6 MCI
BH CV STRESS O2 STAGE 2: 96
BH CV STRESS O2 STAGE 3: 93
BH CV STRESS PROTOCOL 1: NORMAL
BH CV STRESS RECOVERY BP: NORMAL MMHG
BH CV STRESS RECOVERY HR: 69 BPM
BH CV STRESS RECOVERY O2: 97 %
BH CV STRESS SPEED STAGE 1: 1.7
BH CV STRESS SPEED STAGE 2: 2.5
BH CV STRESS SPEED STAGE 3: 3.4
BH CV STRESS STAGE 1: 1
BH CV STRESS STAGE 2: 2
BH CV STRESS STAGE 3: 3
LV EF NUC BP: 64 %
MAXIMAL PREDICTED HEART RATE: 148 BPM
PERCENT MAX PREDICTED HR: 97.3 %
STRESS BASELINE BP: NORMAL MMHG
STRESS BASELINE HR: 46 BPM
STRESS O2 SAT REST: 98 %
STRESS PERCENT HR: 114 %
STRESS POST ESTIMATED WORKLOAD: 10.1 METS
STRESS POST EXERCISE DUR MIN: 8 MIN
STRESS POST EXERCISE DUR SEC: 1 SEC
STRESS POST O2 SAT PEAK: 95 %
STRESS POST PEAK BP: NORMAL MMHG
STRESS POST PEAK HR: 144 BPM
STRESS TARGET HR: 126 BPM

## 2024-07-24 PROCEDURE — 78452 HT MUSCLE IMAGE SPECT MULT: CPT

## 2024-07-24 PROCEDURE — A9500 TC99M SESTAMIBI: HCPCS | Performed by: NURSE PRACTITIONER

## 2024-07-24 PROCEDURE — 78452 HT MUSCLE IMAGE SPECT MULT: CPT | Performed by: INTERNAL MEDICINE

## 2024-07-24 PROCEDURE — 0 TECHNETIUM SESTAMIBI: Performed by: NURSE PRACTITIONER

## 2024-07-24 PROCEDURE — 93017 CV STRESS TEST TRACING ONLY: CPT

## 2024-07-24 PROCEDURE — 93018 CV STRESS TEST I&R ONLY: CPT | Performed by: INTERNAL MEDICINE

## 2024-07-24 RX ADMIN — TECHNETIUM TC 99M SESTAMIBI 1 DOSE: 1 INJECTION INTRAVENOUS at 09:30

## 2024-07-24 RX ADMIN — TECHNETIUM TC 99M SESTAMIBI 1 DOSE: 1 INJECTION INTRAVENOUS at 07:50

## 2024-07-25 ENCOUNTER — TELEPHONE (OUTPATIENT)
Dept: CARDIOLOGY | Facility: HOSPITAL | Age: 74
End: 2024-07-25
Payer: MEDICARE

## 2024-07-25 DIAGNOSIS — I34.1 MITRAL VALVE PROLAPSE: ICD-10-CM

## 2024-07-25 DIAGNOSIS — R07.9 CHEST PAIN, UNSPECIFIED TYPE: Primary | ICD-10-CM

## 2024-07-25 DIAGNOSIS — R94.39 ABNORMAL NUCLEAR STRESS TEST: ICD-10-CM

## 2024-07-25 RX ORDER — AMLODIPINE BESYLATE 5 MG/1
5 TABLET ORAL DAILY
Qty: 30 TABLET | Refills: 3 | Status: SHIPPED | OUTPATIENT
Start: 2024-07-25

## 2024-07-25 NOTE — TELEPHONE ENCOUNTER
Called patient discussed stress test results. Moderate-sized defect  in the lateral wall without reversibility noted, study considered low risk. Plan to have him establish care with cardiology. Will also discuss results with cardiology. Advised him to start ASA 81mg.  He is going to send his recent BP readings and we will consider starting an antianginal.  He reports that he has had some intermittent left-sided chest pressure, but not for several days and never with exertion.  Will contact with plans once I hear back from cardiology

## 2024-08-01 ENCOUNTER — OFFICE VISIT (OUTPATIENT)
Dept: CARDIOLOGY | Facility: CLINIC | Age: 74
End: 2024-08-01
Payer: MEDICARE

## 2024-08-01 VITALS
DIASTOLIC BLOOD PRESSURE: 68 MMHG | HEART RATE: 68 BPM | HEIGHT: 72 IN | OXYGEN SATURATION: 96 % | BODY MASS INDEX: 23 KG/M2 | SYSTOLIC BLOOD PRESSURE: 114 MMHG | WEIGHT: 169.8 LBS

## 2024-08-01 DIAGNOSIS — R00.2 PALPITATIONS: ICD-10-CM

## 2024-08-01 DIAGNOSIS — R94.39 ABNORMAL STRESS TEST: ICD-10-CM

## 2024-08-01 DIAGNOSIS — I05.1 RHEUMATIC MITRAL REGURGITATION: Chronic | ICD-10-CM

## 2024-08-01 DIAGNOSIS — E78.00 PURE HYPERCHOLESTEROLEMIA: ICD-10-CM

## 2024-08-01 DIAGNOSIS — I71.21 ANEURYSM OF ASCENDING AORTA WITHOUT RUPTURE: ICD-10-CM

## 2024-08-01 DIAGNOSIS — R06.09 DOE (DYSPNEA ON EXERTION): ICD-10-CM

## 2024-08-01 DIAGNOSIS — I20.89 ANGINAL EQUIVALENT: ICD-10-CM

## 2024-08-01 DIAGNOSIS — R07.2 PRECORDIAL CHEST PAIN: Primary | ICD-10-CM

## 2024-08-01 RX ORDER — ASPIRIN 81 MG/1
81 TABLET ORAL DAILY
COMMUNITY

## 2024-08-01 NOTE — H&P (VIEW-ONLY)
"OFFICE VISIT  NOTE  [facility] CARDIOLOGY      Name: Claude Lucas    Date: 2024  MRN:  [id number]  :  1950      REFERRING/PRIMARY PROVIDER:  Monica Yun MD    Chief Complaint   Patient presents with    Chest Pain     \"Little pressure and discomfort on left side of chest, radiating around the side.\"    Hypertension     Systolic has been running in 140s    abnormal stress test    Mitral Valve Prolapse    Shortness of Breath     On exertion      Dizziness     intermittent       HPI: Claude Lucas is a 74 y.o. male who presents for chest pain.  He has a strong family history of premature CAD father brother and an uncle with premature coronary disease, also has history of hypertension hyperlipidemia.  He reports 3-week history of left-sided chest pressure, can occur at rest or exertion.  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  He had a nuclear stress test showing moderate inferior infarct with no significant ischemia but did have ST depression at peak exercise in the inferior lateral leads with some elevation in aVR.  Echocardiogram showed mild bileaflet mitral valve prolapse with moderate mitral valve regurgitation.    Past Medical History:   Diagnosis Date    Allergic     seasonal    Arthritis 2018    In hands & thumbs    Ayala's esophagus     Benign prostatic hyperplasia     Cataract 2019    COVID-19 2022    Diverticulosis     GERD (gastroesophageal reflux disease)     with schatzkis ring status post esophageal dilation    Headache     Heart murmur As a child    Later not noted    History of medical problems 2023    Pain/Arthritis in hip, left toe, thumb joints    History of snoring     with resolution using bi guard    Hyperlipidemia     Hypertension In 50s    Noted and started cycling which helped signifantly    Kidney stones     Mitral valve prolapse     Rheumatic fever     RLS (restless legs syndrome)     Sleep apnea In 50s    " Boarderline with snoring. Sleep study    Transient global amnesia 2020    Vitreous disorder     seperation with residual floaters       Past Surgical History:   Procedure Laterality Date    COLONOSCOPY  3/2023    Due for colonoscopy and upper end oscopy    SKIN BIOPSY      4 SKs and AKs     WISDOM TOOTH EXTRACTION         Social History     Socioeconomic History    Marital status:      Spouse name: Lisa   Tobacco Use    Smoking status: Never     Passive exposure: Never    Smokeless tobacco: Never   Vaping Use    Vaping status: Never Used   Substance and Sexual Activity    Alcohol use: Yes     Alcohol/week: 6.0 standard drinks of alcohol     Types: 3 Glasses of wine, 3 Drinks containing 0.5 oz of alcohol per week     Comment: 5    Drug use: Never    Sexual activity: Not Currently     Partners: Female     Birth control/protection: Hysterectomy       Family History   Problem Relation Age of Onset    Breast cancer Mother 39    Heart disease Father     Hypertension Father     Diabetes type I Father     Colon polyps Father     Mitral valve prolapse Brother     Other Brother          of cardiac arrest 4/3/2020    Heart disease Brother         valve replacement from infection in j luis valve    Psoriasis Daughter     Arthritis Daughter         some in my thumb joints as well    Psoriasis Cousin     Arthritis Cousin     Colon cancer Neg Hx     Lung cancer Neg Hx         ROS:   Constitutional no fever,  no weight loss   Skin no rash, no subcutaneous nodules   Otolaryngeal no difficulty swallowing   Cardiovascular See HPI   Pulmonary no cough, no sputum production   Gastrointestinal no constipation, no diarrhea   Genitourinary no dysuria, no hematuria   Hematologic no easy bruisability, no abnormal bleeding   Musculoskeletal no muscle pain   Neurologic no dizziness, no falls         No Known Allergies      Current Outpatient Medications:     amLODIPine (NORVASC) 5 MG tablet, Take 1 tablet by mouth Daily., Disp:  "30 tablet, Rfl: 3    aspirin 81 MG EC tablet, Take 1 tablet by mouth Daily., Disp: , Rfl:     atorvastatin (LIPITOR) 20 MG tablet, Take 1 tablet by mouth Every Evening., Disp: 90 tablet, Rfl: 3    fexofenadine (ALLEGRA) 180 MG tablet, Take 1 tablet by mouth Daily. Pt states that he only takes this in the spring time., Disp: , Rfl:     fluticasone (FLONASE) 50 MCG/ACT nasal spray, 2 sprays into the nostril(s) as directed by provider Daily., Disp: , Rfl:     glucosamine-chondroitin 500-400 MG capsule capsule, Take  by mouth Daily., Disp: , Rfl:     multivitamin with minerals tablet tablet, Take 1 tablet by mouth Daily., Disp: , Rfl:     omeprazole (priLOSEC) 20 MG capsule, Take 1 capsule by mouth Daily., Disp: 90 capsule, Rfl: 3    Vitals:    08/01/24 0836   BP: 114/68   BP Location: Right arm   Patient Position: Sitting   Pulse: 68   SpO2: 96%   Weight: 77 kg (169 lb 12.8 oz)   Height: 182.9 cm (72\")     Body mass index is 23.03 kg/m².    PHYSICAL EXAM:    General Appearance:   well developed  well nourished  HENT:   oropharynx moist  lips not cyanotic  Neck:  thyroid not enlarged  supple  Respiratory:  no respiratory distress  normal breath sounds  no rales  Cardiovascular:  no jugular venous distention  regular rhythm  apical impulse normal  S1 normal, S2 normal  no S3, no S4   no murmur  no rub, no thrill  carotid pulses normal; no bruit  lower extremity edema: none      Musculoskeletal:  no clubbing of fingers.   normocephalic, head atraumatic  Skin:   warm, dry  Psychiatric:  judgement and insight appropriate  normal mood and affect    RESULTS:   Procedures    Results for orders placed during the hospital encounter of 07/19/24    Adult Transthoracic Echo Complete W/ Cont if Necessary Per Protocol    Interpretation Summary    Left ventricular systolic function is normal. Calculated left ventricular EF = 57.3% Normal left ventricular cavity size and wall thickness noted. All left ventricular wall segments contract " "normally. Normal left atrial pressure.    Normal left atrial cavity size noted.    The aortic valve exhibits sclerosis. There is mild thickening of the aortic valve. The aortic valve appears trileaflet. Trace aortic valve regurgitation is present. No aortic valve stenosis is present.    There is mild bileaflet mitral valve prolapse present. Moderate mitral valve regurgitation is present. No significant mitral valve stenosis is present.    The tricuspid valve is normal in structure. Mild tricuspid valve regurgitation is present. Estimated right ventricular systolic pressure from tricuspid regurgitation is normal (<35 mmHg).    Mild dilation of the ascending aorta is present. Ascending aorta = 3.9 cm        Labs:  Lab Results   Component Value Date    CHOL 137 03/11/2024    TRIG 72 03/11/2024    HDL 50 03/11/2024    LDL 73 03/11/2024    AST 29 07/08/2024    ALT 23 07/08/2024     No results found for: \"HGBA1C\"  No components found for: \"CREATINININE\"  eGFR Non  Amer   Date Value Ref Range Status   03/01/2021 76 >60 mL/min/1.73 Final   12/20/2020 76 >60 mL/min/1.73 Final   02/27/2020 70 >60 mL/min/1.73 Final       Most recent PCP note, imaging tests, and labs reviewed.    ASSESSMENT:  Problem List Items Addressed This Visit       Rheumatic mitral regurgitation (Chronic)    Pure hypercholesterolemia    Palpitations    Precordial chest pain - Primary    ALEXANDER (dyspnea on exertion)    Aneurysm of ascending aorta without rupture       PLAN:    1.  Chest pain with abnormal nuclear stress test:  Gave the patient option of coronary CTA versus cardiac catheterization, he is leaning towards cardiac catheterization but would like to discuss with his wife and will call us if he decides to proceed.  For now continue aspirin, atorvastatin, amlodipine.  The patient was advised to call 911 if chest pain worsens or persists despite rest or nitroglycerin if available, and to avoid strenuous activity until further notice.  Advised " patient to avoid strenuous activity until further testing completed.    2.  Moderate mitral valve regurgitation:  Rheumatic mitral valve regurgitation with mild bileaflet prolapse  Repeat echocardiogram around 8/2026  Relatively asymptomatic at this time    3.  Hyperlipidemia:  Well-controlled on atorvastatin 20 mg daily, continue.    4.  Hypertension:  Goal blood pressure less than 130/80, continue amlodipine which she is tolerating well has been on for about 1 week at this time.  Continue low-sodium diet    Advance Care Planning   ACP discussion was held with the patient during this visit. Patient has an advance directive (not in EMR), copy requested.         Return to clinic in 6 months, or sooner as needed.    Thank you for the opportunity to share in the care of your patient; please do not hesitate to call me with any questions.     Chuy Hardy MD, [facility]  Office: [phone] [address] [place] [address]    08/01/24

## 2024-08-01 NOTE — PROGRESS NOTES
"OFFICE VISIT  NOTE  [facility] CARDIOLOGY      Name: Claude Lucas    Date: 2024  MRN:  [id number]  :  1950      REFERRING/PRIMARY PROVIDER:  Monica Yun MD    Chief Complaint   Patient presents with    Chest Pain     \"Little pressure and discomfort on left side of chest, radiating around the side.\"    Hypertension     Systolic has been running in 140s    abnormal stress test    Mitral Valve Prolapse    Shortness of Breath     On exertion      Dizziness     intermittent       HPI: Claude Lucas is a 74 y.o. male who presents for chest pain.  He has a strong family history of premature CAD father brother and an uncle with premature coronary disease, also has history of hypertension hyperlipidemia.  He reports 3-week history of left-sided chest pressure, can occur at rest or exertion.  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  He had a nuclear stress test showing moderate inferior infarct with no significant ischemia but did have ST depression at peak exercise in the inferior lateral leads with some elevation in aVR.  Echocardiogram showed mild bileaflet mitral valve prolapse with moderate mitral valve regurgitation.    Past Medical History:   Diagnosis Date    Allergic     seasonal    Arthritis 2018    In hands & thumbs    Ayala's esophagus     Benign prostatic hyperplasia     Cataract 2019    COVID-19 2022    Diverticulosis     GERD (gastroesophageal reflux disease)     with schatzkis ring status post esophageal dilation    Headache     Heart murmur As a child    Later not noted    History of medical problems 2023    Pain/Arthritis in hip, left toe, thumb joints    History of snoring     with resolution using bi guard    Hyperlipidemia     Hypertension In 50s    Noted and started cycling which helped signifantly    Kidney stones     Mitral valve prolapse     Rheumatic fever     RLS (restless legs syndrome)     Sleep apnea In 50s    " Boarderline with snoring. Sleep study    Transient global amnesia 2020    Vitreous disorder     seperation with residual floaters       Past Surgical History:   Procedure Laterality Date    COLONOSCOPY  3/2023    Due for colonoscopy and upper end oscopy    SKIN BIOPSY      4 SKs and AKs     WISDOM TOOTH EXTRACTION         Social History     Socioeconomic History    Marital status:      Spouse name: Lisa   Tobacco Use    Smoking status: Never     Passive exposure: Never    Smokeless tobacco: Never   Vaping Use    Vaping status: Never Used   Substance and Sexual Activity    Alcohol use: Yes     Alcohol/week: 6.0 standard drinks of alcohol     Types: 3 Glasses of wine, 3 Drinks containing 0.5 oz of alcohol per week     Comment: 5    Drug use: Never    Sexual activity: Not Currently     Partners: Female     Birth control/protection: Hysterectomy       Family History   Problem Relation Age of Onset    Breast cancer Mother 39    Heart disease Father     Hypertension Father     Diabetes type I Father     Colon polyps Father     Mitral valve prolapse Brother     Other Brother          of cardiac arrest 4/3/2020    Heart disease Brother         valve replacement from infection in j luis valve    Psoriasis Daughter     Arthritis Daughter         some in my thumb joints as well    Psoriasis Cousin     Arthritis Cousin     Colon cancer Neg Hx     Lung cancer Neg Hx         ROS:   Constitutional no fever,  no weight loss   Skin no rash, no subcutaneous nodules   Otolaryngeal no difficulty swallowing   Cardiovascular See HPI   Pulmonary no cough, no sputum production   Gastrointestinal no constipation, no diarrhea   Genitourinary no dysuria, no hematuria   Hematologic no easy bruisability, no abnormal bleeding   Musculoskeletal no muscle pain   Neurologic no dizziness, no falls         No Known Allergies      Current Outpatient Medications:     amLODIPine (NORVASC) 5 MG tablet, Take 1 tablet by mouth Daily., Disp:  "30 tablet, Rfl: 3    aspirin 81 MG EC tablet, Take 1 tablet by mouth Daily., Disp: , Rfl:     atorvastatin (LIPITOR) 20 MG tablet, Take 1 tablet by mouth Every Evening., Disp: 90 tablet, Rfl: 3    fexofenadine (ALLEGRA) 180 MG tablet, Take 1 tablet by mouth Daily. Pt states that he only takes this in the spring time., Disp: , Rfl:     fluticasone (FLONASE) 50 MCG/ACT nasal spray, 2 sprays into the nostril(s) as directed by provider Daily., Disp: , Rfl:     glucosamine-chondroitin 500-400 MG capsule capsule, Take  by mouth Daily., Disp: , Rfl:     multivitamin with minerals tablet tablet, Take 1 tablet by mouth Daily., Disp: , Rfl:     omeprazole (priLOSEC) 20 MG capsule, Take 1 capsule by mouth Daily., Disp: 90 capsule, Rfl: 3    Vitals:    08/01/24 0836   BP: 114/68   BP Location: Right arm   Patient Position: Sitting   Pulse: 68   SpO2: 96%   Weight: 77 kg (169 lb 12.8 oz)   Height: 182.9 cm (72\")     Body mass index is 23.03 kg/m².    PHYSICAL EXAM:    General Appearance:   well developed  well nourished  HENT:   oropharynx moist  lips not cyanotic  Neck:  thyroid not enlarged  supple  Respiratory:  no respiratory distress  normal breath sounds  no rales  Cardiovascular:  no jugular venous distention  regular rhythm  apical impulse normal  S1 normal, S2 normal  no S3, no S4   no murmur  no rub, no thrill  carotid pulses normal; no bruit  lower extremity edema: none      Musculoskeletal:  no clubbing of fingers.   normocephalic, head atraumatic  Skin:   warm, dry  Psychiatric:  judgement and insight appropriate  normal mood and affect    RESULTS:   Procedures    Results for orders placed during the hospital encounter of 07/19/24    Adult Transthoracic Echo Complete W/ Cont if Necessary Per Protocol    Interpretation Summary    Left ventricular systolic function is normal. Calculated left ventricular EF = 57.3% Normal left ventricular cavity size and wall thickness noted. All left ventricular wall segments contract " "normally. Normal left atrial pressure.    Normal left atrial cavity size noted.    The aortic valve exhibits sclerosis. There is mild thickening of the aortic valve. The aortic valve appears trileaflet. Trace aortic valve regurgitation is present. No aortic valve stenosis is present.    There is mild bileaflet mitral valve prolapse present. Moderate mitral valve regurgitation is present. No significant mitral valve stenosis is present.    The tricuspid valve is normal in structure. Mild tricuspid valve regurgitation is present. Estimated right ventricular systolic pressure from tricuspid regurgitation is normal (<35 mmHg).    Mild dilation of the ascending aorta is present. Ascending aorta = 3.9 cm        Labs:  Lab Results   Component Value Date    CHOL 137 03/11/2024    TRIG 72 03/11/2024    HDL 50 03/11/2024    LDL 73 03/11/2024    AST 29 07/08/2024    ALT 23 07/08/2024     No results found for: \"HGBA1C\"  No components found for: \"CREATINININE\"  eGFR Non  Amer   Date Value Ref Range Status   03/01/2021 76 >60 mL/min/1.73 Final   12/20/2020 76 >60 mL/min/1.73 Final   02/27/2020 70 >60 mL/min/1.73 Final       Most recent PCP note, imaging tests, and labs reviewed.    ASSESSMENT:  Problem List Items Addressed This Visit       Rheumatic mitral regurgitation (Chronic)    Pure hypercholesterolemia    Palpitations    Precordial chest pain - Primary    ALEXANDER (dyspnea on exertion)    Aneurysm of ascending aorta without rupture       PLAN:    1.  Chest pain with abnormal nuclear stress test:  Gave the patient option of coronary CTA versus cardiac catheterization, he is leaning towards cardiac catheterization but would like to discuss with his wife and will call us if he decides to proceed.  For now continue aspirin, atorvastatin, amlodipine.  The patient was advised to call 911 if chest pain worsens or persists despite rest or nitroglycerin if available, and to avoid strenuous activity until further notice.  Advised " patient to avoid strenuous activity until further testing completed.    2.  Moderate mitral valve regurgitation:  Rheumatic mitral valve regurgitation with mild bileaflet prolapse  Repeat echocardiogram around 8/2026  Relatively asymptomatic at this time    3.  Hyperlipidemia:  Well-controlled on atorvastatin 20 mg daily, continue.    4.  Hypertension:  Goal blood pressure less than 130/80, continue amlodipine which she is tolerating well has been on for about 1 week at this time.  Continue low-sodium diet    Advance Care Planning   ACP discussion was held with the patient during this visit. Patient has an advance directive (not in EMR), copy requested.         Return to clinic in 6 months, or sooner as needed.    Thank you for the opportunity to share in the care of your patient; please do not hesitate to call me with any questions.     Chuy Hardy MD, [facility], [facility]  Office: [phone] [address] [place] [address]    08/01/24

## 2024-08-15 ENCOUNTER — PREP FOR SURGERY (OUTPATIENT)
Dept: OTHER | Facility: HOSPITAL | Age: 74
End: 2024-08-15
Payer: MEDICARE

## 2024-08-15 RX ORDER — NITROGLYCERIN 0.4 MG/1
0.4 TABLET SUBLINGUAL
Status: CANCELLED | OUTPATIENT
Start: 2024-08-15

## 2024-08-15 RX ORDER — ASPIRIN 81 MG/1
81 TABLET ORAL DAILY
Status: CANCELLED | OUTPATIENT
Start: 2024-08-16

## 2024-08-15 RX ORDER — SODIUM CHLORIDE 0.9 % (FLUSH) 0.9 %
10 SYRINGE (ML) INJECTION AS NEEDED
Status: CANCELLED | OUTPATIENT
Start: 2024-08-15

## 2024-08-15 RX ORDER — ACETAMINOPHEN 325 MG/1
650 TABLET ORAL EVERY 4 HOURS PRN
Status: CANCELLED | OUTPATIENT
Start: 2024-08-15

## 2024-08-15 RX ORDER — SODIUM CHLORIDE 0.9 % (FLUSH) 0.9 %
10 SYRINGE (ML) INJECTION EVERY 12 HOURS SCHEDULED
Status: CANCELLED | OUTPATIENT
Start: 2024-08-15

## 2024-08-15 RX ORDER — ASPIRIN 81 MG/1
324 TABLET, CHEWABLE ORAL ONCE
Status: CANCELLED | OUTPATIENT
Start: 2024-08-15 | End: 2024-08-15

## 2024-08-15 RX ORDER — SODIUM CHLORIDE 9 MG/ML
40 INJECTION, SOLUTION INTRAVENOUS AS NEEDED
Status: CANCELLED | OUTPATIENT
Start: 2024-08-15

## 2024-08-16 ENCOUNTER — HOSPITAL ENCOUNTER (OUTPATIENT)
Facility: HOSPITAL | Age: 74
Setting detail: HOSPITAL OUTPATIENT SURGERY
Discharge: HOME | End: 2024-08-16
Attending: INTERNAL MEDICINE | Admitting: INTERNAL MEDICINE
Payer: MEDICARE

## 2024-08-16 VITALS
OXYGEN SATURATION: 97 % | RESPIRATION RATE: 20 BRPM | TEMPERATURE: 97.4 F | SYSTOLIC BLOOD PRESSURE: 116 MMHG | WEIGHT: 168.2 LBS | HEART RATE: 54 BPM | DIASTOLIC BLOOD PRESSURE: 64 MMHG | BODY MASS INDEX: 22.78 KG/M2 | HEIGHT: 72 IN

## 2024-08-16 DIAGNOSIS — R07.2 PRECORDIAL CHEST PAIN: ICD-10-CM

## 2024-08-16 DIAGNOSIS — I20.89 ANGINAL EQUIVALENT: ICD-10-CM

## 2024-08-16 DIAGNOSIS — R06.09 DOE (DYSPNEA ON EXERTION): ICD-10-CM

## 2024-08-16 DIAGNOSIS — R94.39 ABNORMAL STRESS TEST: ICD-10-CM

## 2024-08-16 LAB
ALBUMIN SERPL-MCNC: 4.1 G/DL (ref 3.5–5.2)
ALBUMIN/GLOB SERPL: 1.8 G/DL
ALP SERPL-CCNC: 80 U/L (ref 39–117)
ALT SERPL W P-5'-P-CCNC: 30 U/L (ref 1–41)
ANION GAP SERPL CALCULATED.3IONS-SCNC: 9 MMOL/L (ref 5–15)
AST SERPL-CCNC: 28 U/L (ref 1–40)
BILIRUB SERPL-MCNC: 0.6 MG/DL (ref 0–1.2)
BUN SERPL-MCNC: 20 MG/DL (ref 8–23)
BUN/CREAT SERPL: 19.6 (ref 7–25)
CALCIUM SPEC-SCNC: 8.8 MG/DL (ref 8.6–10.5)
CHLORIDE SERPL-SCNC: 108 MMOL/L (ref 98–107)
CHOLEST SERPL-MCNC: 114 MG/DL (ref 0–200)
CO2 SERPL-SCNC: 25 MMOL/L (ref 22–29)
CREAT SERPL-MCNC: 1.02 MG/DL (ref 0.76–1.27)
DEPRECATED RDW RBC AUTO: 45 FL (ref 37–54)
EGFRCR SERPLBLD CKD-EPI 2021: 78.1 ML/MIN/1.73
ERYTHROCYTE [DISTWIDTH] IN BLOOD BY AUTOMATED COUNT: 13 % (ref 12.3–15.4)
GLOBULIN UR ELPH-MCNC: 2.3 GM/DL
GLUCOSE SERPL-MCNC: 86 MG/DL (ref 65–99)
HCT VFR BLD AUTO: 40.8 % (ref 37.5–51)
HDLC SERPL-MCNC: 47 MG/DL (ref 40–60)
HGB BLD-MCNC: 13.7 G/DL (ref 13–17.7)
LDLC SERPL CALC-MCNC: 56 MG/DL (ref 0–100)
LDLC/HDLC SERPL: 1.23 {RATIO}
MCH RBC QN AUTO: 31.6 PG (ref 26.6–33)
MCHC RBC AUTO-ENTMCNC: 33.6 G/DL (ref 31.5–35.7)
MCV RBC AUTO: 94.2 FL (ref 79–97)
PLATELET # BLD AUTO: 195 10*3/MM3 (ref 140–450)
PMV BLD AUTO: 10 FL (ref 6–12)
POTASSIUM SERPL-SCNC: 4.2 MMOL/L (ref 3.5–5.2)
PROT SERPL-MCNC: 6.4 G/DL (ref 6–8.5)
RBC # BLD AUTO: 4.33 10*6/MM3 (ref 4.14–5.8)
SODIUM SERPL-SCNC: 142 MMOL/L (ref 136–145)
TRIGL SERPL-MCNC: 46 MG/DL (ref 0–150)
VLDLC SERPL-MCNC: 11 MG/DL (ref 5–40)
WBC NRBC COR # BLD AUTO: 4.48 10*3/MM3 (ref 3.4–10.8)

## 2024-08-16 PROCEDURE — 25810000003 SODIUM CHLORIDE 0.9 % SOLUTION: Performed by: PHYSICIAN ASSISTANT

## 2024-08-16 PROCEDURE — C1894 INTRO/SHEATH, NON-LASER: HCPCS | Performed by: INTERNAL MEDICINE

## 2024-08-16 PROCEDURE — 25010000002 NICARDIPINE 2.5 MG/ML SOLUTION: Performed by: INTERNAL MEDICINE

## 2024-08-16 PROCEDURE — C1769 GUIDE WIRE: HCPCS | Performed by: INTERNAL MEDICINE

## 2024-08-16 PROCEDURE — 36415 COLL VENOUS BLD VENIPUNCTURE: CPT

## 2024-08-16 PROCEDURE — 25510000001 IOPAMIDOL PER 1 ML: Performed by: INTERNAL MEDICINE

## 2024-08-16 PROCEDURE — 93458 L HRT ARTERY/VENTRICLE ANGIO: CPT | Performed by: INTERNAL MEDICINE

## 2024-08-16 PROCEDURE — 25010000002 FENTANYL CITRATE (PF) 50 MCG/ML SOLUTION: Performed by: INTERNAL MEDICINE

## 2024-08-16 PROCEDURE — 80053 COMPREHEN METABOLIC PANEL: CPT | Performed by: PHYSICIAN ASSISTANT

## 2024-08-16 PROCEDURE — 25010000002 MIDAZOLAM PER 1 MG: Performed by: INTERNAL MEDICINE

## 2024-08-16 PROCEDURE — 85027 COMPLETE CBC AUTOMATED: CPT | Performed by: PHYSICIAN ASSISTANT

## 2024-08-16 PROCEDURE — 80061 LIPID PANEL: CPT | Performed by: PHYSICIAN ASSISTANT

## 2024-08-16 PROCEDURE — 25010000002 HEPARIN (PORCINE) PER 1000 UNITS: Performed by: INTERNAL MEDICINE

## 2024-08-16 RX ORDER — ACETAMINOPHEN 325 MG/1
650 TABLET ORAL EVERY 4 HOURS PRN
Status: DISCONTINUED | OUTPATIENT
Start: 2024-08-16 | End: 2024-08-16 | Stop reason: HOSPADM

## 2024-08-16 RX ORDER — SODIUM CHLORIDE 0.9 % (FLUSH) 0.9 %
10 SYRINGE (ML) INJECTION AS NEEDED
Status: DISCONTINUED | OUTPATIENT
Start: 2024-08-16 | End: 2024-08-16 | Stop reason: HOSPADM

## 2024-08-16 RX ORDER — LIDOCAINE HYDROCHLORIDE 10 MG/ML
INJECTION, SOLUTION EPIDURAL; INFILTRATION; INTRACAUDAL; PERINEURAL
Status: DISCONTINUED | OUTPATIENT
Start: 2024-08-16 | End: 2024-08-16 | Stop reason: HOSPADM

## 2024-08-16 RX ORDER — NICARDIPINE HYDROCHLORIDE 2.5 MG/ML
INJECTION INTRAVENOUS
Status: DISCONTINUED | OUTPATIENT
Start: 2024-08-16 | End: 2024-08-16 | Stop reason: HOSPADM

## 2024-08-16 RX ORDER — MIDAZOLAM HYDROCHLORIDE 1 MG/ML
INJECTION INTRAMUSCULAR; INTRAVENOUS
Status: DISCONTINUED | OUTPATIENT
Start: 2024-08-16 | End: 2024-08-16 | Stop reason: HOSPADM

## 2024-08-16 RX ORDER — ASPIRIN 81 MG/1
324 TABLET, CHEWABLE ORAL ONCE
Status: COMPLETED | OUTPATIENT
Start: 2024-08-16 | End: 2024-08-16

## 2024-08-16 RX ORDER — SODIUM CHLORIDE 9 MG/ML
1 INJECTION, SOLUTION INTRAVENOUS CONTINUOUS
Status: DISCONTINUED | OUTPATIENT
Start: 2024-08-16 | End: 2024-08-16 | Stop reason: HOSPADM

## 2024-08-16 RX ORDER — ASPIRIN 81 MG/1
81 TABLET ORAL DAILY
Status: DISCONTINUED | OUTPATIENT
Start: 2024-08-17 | End: 2024-08-16 | Stop reason: HOSPADM

## 2024-08-16 RX ORDER — SODIUM CHLORIDE 0.9 % (FLUSH) 0.9 %
10 SYRINGE (ML) INJECTION EVERY 12 HOURS SCHEDULED
Status: DISCONTINUED | OUTPATIENT
Start: 2024-08-16 | End: 2024-08-16 | Stop reason: HOSPADM

## 2024-08-16 RX ORDER — FENTANYL CITRATE 50 UG/ML
INJECTION, SOLUTION INTRAMUSCULAR; INTRAVENOUS
Status: DISCONTINUED | OUTPATIENT
Start: 2024-08-16 | End: 2024-08-16 | Stop reason: HOSPADM

## 2024-08-16 RX ORDER — NITROGLYCERIN 0.4 MG/1
0.4 TABLET SUBLINGUAL
Status: DISCONTINUED | OUTPATIENT
Start: 2024-08-16 | End: 2024-08-16 | Stop reason: HOSPADM

## 2024-08-16 RX ORDER — HEPARIN SODIUM 1000 [USP'U]/ML
INJECTION, SOLUTION INTRAVENOUS; SUBCUTANEOUS
Status: DISCONTINUED | OUTPATIENT
Start: 2024-08-16 | End: 2024-08-16 | Stop reason: HOSPADM

## 2024-08-16 RX ORDER — SODIUM CHLORIDE 9 MG/ML
40 INJECTION, SOLUTION INTRAVENOUS AS NEEDED
Status: DISCONTINUED | OUTPATIENT
Start: 2024-08-16 | End: 2024-08-16 | Stop reason: HOSPADM

## 2024-08-16 RX ADMIN — SODIUM CHLORIDE 231 ML: 9 INJECTION, SOLUTION INTRAVENOUS at 06:51

## 2024-08-16 RX ADMIN — ASPIRIN 81 MG 243 MG: 81 TABLET ORAL at 06:51

## 2024-08-16 NOTE — INTERVAL H&P NOTE
H&P reviewed.  The patient was examined and there are no changes to the H&P    The risks, benefits, and alternative options of cardiac catheterization were discussed with the patient.  The risks include death, MI, stroke, infection, vascular injury requiring surgical repair and/or blood transfusion, coronary dissection, renal dysfunction/failure, allergic reaction, emergent CABG.  If PCI is needed there is a 1-2% risk of emergent CABG.  The patient is agreeable for cardiac catheterization, possible PCI or CABG. Plan is to proceed with cardiac catheterization and possible PCI.     Chuy Hardy MD, FACC, [facility]  Interventional Cardiology  08/16/24  07:43 EDT

## 2024-08-25 ENCOUNTER — PATIENT MESSAGE (OUTPATIENT)
Age: 74
End: 2024-08-25
Payer: MEDICARE

## 2024-11-19 RX ORDER — AMLODIPINE BESYLATE 5 MG/1
5 TABLET ORAL DAILY
Qty: 90 TABLET | Refills: 1 | Status: SHIPPED | OUTPATIENT
Start: 2024-11-19

## 2024-11-19 NOTE — TELEPHONE ENCOUNTER
Lab Results   Component Value Date    GLUCOSE 86 08/16/2024    BUN 20 08/16/2024    CREATININE 1.02 08/16/2024     08/16/2024    K 4.2 08/16/2024     (H) 08/16/2024    CALCIUM 8.8 08/16/2024    PROTEINTOT 6.4 08/16/2024    ALBUMIN 4.1 08/16/2024    ALT 30 08/16/2024    AST 28 08/16/2024    ALKPHOS 80 08/16/2024    BILITOT 0.6 08/16/2024    GLOB 2.3 08/16/2024    AGRATIO 1.8 08/16/2024    BCR 19.6 08/16/2024    ANIONGAP 9.0 08/16/2024    EGFR 78.1 08/16/2024

## 2024-12-02 ENCOUNTER — OFFICE VISIT (OUTPATIENT)
Age: 74
End: 2024-12-02
Payer: MEDICARE

## 2024-12-02 VITALS
HEIGHT: 72 IN | TEMPERATURE: 99.7 F | BODY MASS INDEX: 23.03 KG/M2 | SYSTOLIC BLOOD PRESSURE: 142 MMHG | HEART RATE: 86 BPM | WEIGHT: 170 LBS | OXYGEN SATURATION: 99 % | DIASTOLIC BLOOD PRESSURE: 62 MMHG

## 2024-12-02 DIAGNOSIS — R05.1 ACUTE COUGH: ICD-10-CM

## 2024-12-02 DIAGNOSIS — J06.9 UPPER RESPIRATORY TRACT INFECTION, UNSPECIFIED TYPE: Primary | ICD-10-CM

## 2024-12-02 PROCEDURE — 99213 OFFICE O/P EST LOW 20 MIN: CPT | Performed by: NURSE PRACTITIONER

## 2024-12-02 PROCEDURE — 1126F AMNT PAIN NOTED NONE PRSNT: CPT | Performed by: NURSE PRACTITIONER

## 2024-12-02 RX ORDER — BENZONATATE 100 MG/1
100 CAPSULE ORAL 3 TIMES DAILY PRN
Qty: 60 CAPSULE | Refills: 0 | Status: SHIPPED | OUTPATIENT
Start: 2024-12-02

## 2024-12-02 RX ORDER — AMOXICILLIN 875 MG/1
875 TABLET, COATED ORAL 2 TIMES DAILY
Qty: 14 TABLET | Refills: 0 | Status: SHIPPED | OUTPATIENT
Start: 2024-12-02 | End: 2024-12-09

## 2024-12-02 NOTE — PROGRESS NOTES
Chief Complaint  Generalized Body Aches (Sx started 11/19/2024), Cough, Nasal Congestion (/), and Shortness of Breath    Subjective        Claude Lucas presents to [facility]  History of Present Illness    History of Present Illness  The patient presents for evaluation of a cough.    He began experiencing a cough and throat drainage on 11/19/2024, similar to symptoms his wife had three weeks prior, which were diagnosed as bronchitis. Despite a negative home COVID-19 test, he continued to feel unwell with body aches, congestion, and a persistent cough, particularly at night. He sought care at [facility] on 11/21/2024, where both COVID-19 and flu tests were negative. His treatment regimen included Mucinex D twice daily, a 24-hour antihistamine in the morning, and a combination of ibuprofen and acetaminophen.     His condition improved slightly around Thanksgiving, but he experienced a severe coughing fit over the weekend. He increased his intake of ibuprofen and acetaminophen to three times a day, which provided some relief. However, he is now feeling achy again. His temperature at home was between 97.5 and 98 degrees, but it was 99 degrees during this visit. He reports no shortness of breath but mentions occasional wheezing, which seems to clear when he coughs. He has been taking leftover benzonatate from his wife's previous prescription, which seems to help.     His cough has become more persistent and significant, and he reports feeling a lot of drainage. He does not have a sore throat but occasionally experiences chills and hot flashes. He has a history of borderline high blood pressure. He finds that sleeping in a reclined position helps, but he had a severe coughing episode last night that lasted for 30 minutes and only subsided after sitting up for an hour.    ALLERGIES  He has no known drug allergies.    Results  Laboratory Studies  Home Covid test was negative.  "Covid test and flu test at urgent care were both negative.       Objective   Vital Signs:  /62 (BP Location: Left arm, Patient Position: Sitting, Cuff Size: Adult)   Pulse 86   Temp 99.7 °F (37.6 °C) (Oral)   Ht 182.9 cm (72.01\")   Wt 77.1 kg (170 lb)   SpO2 99%   BMI 23.05 kg/m²   Estimated body mass index is 23.05 kg/m² as calculated from the following:    Height as of this encounter: 182.9 cm (72.01\").    Weight as of this encounter: 77.1 kg (170 lb).    BMI is within normal parameters. No other follow-up for BMI required.      Physical Exam  Vitals reviewed.   Constitutional:       Appearance: Normal appearance.   HENT:      Right Ear: Tympanic membrane, ear canal and external ear normal.      Left Ear: Tympanic membrane, ear canal and external ear normal.      Nose: Nose normal.      Mouth/Throat:      Mouth: Mucous membranes are moist.      Pharynx: Oropharynx is clear.   Eyes:      Conjunctiva/sclera: Conjunctivae normal.   Cardiovascular:      Rate and Rhythm: Normal rate and regular rhythm.      Heart sounds: Normal heart sounds.   Pulmonary:      Effort: Pulmonary effort is normal.      Breath sounds: Normal breath sounds. No wheezing or rhonchi.      Comments: Cough present on exam  Musculoskeletal:         General: Normal range of motion.      Cervical back: Normal range of motion.   Skin:     General: Skin is warm.   Neurological:      Mental Status: He is alert and oriented to person, place, and time.   Psychiatric:         Mood and Affect: Mood normal.         Behavior: Behavior normal.         Thought Content: Thought content normal.        Result Review :                  Assessment and Plan   Diagnoses and all orders for this visit:    1. Upper respiratory tract infection, unspecified type (Primary)  -     amoxicillin (AMOXIL) 875 MG tablet; Take 1 tablet by mouth 2 (Two) Times a Day for 7 days.  Dispense: 14 tablet; Refill: 0    2. Acute cough  -     benzonatate (Tessalon Perles) 100 " MG capsule; Take 1 capsule by mouth 3 (Three) Times a Day As Needed for Cough.  Dispense: 60 capsule; Refill: 0        Assessment & Plan  1. Persistent Cough.  Given the persistence of his symptoms and the initial improvement followed by a relapse, a bacterial infection is suspected. Amoxicillin has been prescribed, to be taken twice daily for 7 days. Additionally, Tessalon Perles (benzonatate) have been prescribed for the cough. He is advised to inform us if there is no improvement by the end of the week.         The following portions of the patient's history were reviewed and updated as appropriate: allergies, current medications, past family history, past medical history, past social history, past surgical history, and problem list.       Follow Up   No follow-ups on file.  Patient was given instructions and counseling regarding his condition or for health maintenance advice. Please see specific information pulled into the AVS if appropriate.         Patient or patient representative verbalized consent for the use of Ambient Listening during the visit with  TARAN Berger for chart documentation. 12/2/2024  14:27 EST

## 2025-02-27 ENCOUNTER — OFFICE VISIT (OUTPATIENT)
Dept: CARDIOLOGY | Facility: CLINIC | Age: 75
End: 2025-02-27
Payer: MEDICARE

## 2025-02-27 VITALS
BODY MASS INDEX: 22.75 KG/M2 | SYSTOLIC BLOOD PRESSURE: 116 MMHG | HEIGHT: 72 IN | DIASTOLIC BLOOD PRESSURE: 72 MMHG | WEIGHT: 168 LBS | HEART RATE: 76 BPM | OXYGEN SATURATION: 98 %

## 2025-02-27 DIAGNOSIS — Z86.79 HISTORY OF RHEUMATIC FEVER: Chronic | ICD-10-CM

## 2025-02-27 DIAGNOSIS — R94.39 ABNORMAL STRESS TEST: ICD-10-CM

## 2025-02-27 DIAGNOSIS — I05.1 RHEUMATIC MITRAL REGURGITATION: Primary | Chronic | ICD-10-CM

## 2025-02-27 DIAGNOSIS — R06.09 DOE (DYSPNEA ON EXERTION): ICD-10-CM

## 2025-02-27 DIAGNOSIS — I34.1 MVP (MITRAL VALVE PROLAPSE): ICD-10-CM

## 2025-02-27 DIAGNOSIS — R07.2 PRECORDIAL CHEST PAIN: ICD-10-CM

## 2025-02-27 RX ORDER — AMLODIPINE BESYLATE 5 MG/1
5 TABLET ORAL DAILY
Qty: 90 TABLET | Refills: 1 | Status: SHIPPED | OUTPATIENT
Start: 2025-02-27

## 2025-02-27 NOTE — PROGRESS NOTES
OFFICE VISIT  NOTE  [facility] CARDIOLOGY      Name: Claude Lucas    Date: 2025  MRN:  [id number]  :  1950      REFERRING/PRIMARY PROVIDER:  Monica Yun MD     Chief Complaint   Patient presents with    Precordial chest pain     HPI: Claude Lucas is a 74 y.o. male who presents today for follow up of chest pain, cath 2024 normal coronaries, and mitral valve disease. He has a strong family history of premature CAD father brother and an uncle with premature coronary disease, also has history of hypertension hyperlipidemia. He is a cyclist and cycles 2 to 3 days/week. Had chest pain last year, had a nuclear stress test showing moderate inferior infarct with no significant ischemia but did have ST depression at peak exercise in the inferior lateral leads with some elevation in aVR. Echocardiogram showed mild bileaflet mitral valve prolapse with moderate mitral valve regurgitation. Cath 2024 with normal coronaries, false positive stress test.   Since last visit he has remained stable from cardiac standpoint. He rode his bike 22 miles yesterday and did not have any chest pain or unusual dyspnea. He denies chest pain now, no ALEXANDER, heart failure symptoms. BP well controlled by home BP log.     ROS:Pertinent positives as listed in the HPI.  All other systems reviewed and negative.    Past Medical History:   Diagnosis Date    Allergic     seasonal    Arthritis 2018    In hands & thumbs    Ayala's esophagus     Benign prostatic hyperplasia     Cataract 2019    COVID-19 2022    Diverticulosis     GERD (gastroesophageal reflux disease)     with schatzkis ring status post esophageal dilation    Headache     Heart murmur As a child    Later not noted    History of medical problems 2023    Pain/Arthritis in hip, left toe, thumb joints    History of snoring     with resolution using bi guard    Hyperlipidemia     Hypertension In 50s    Noted and started cycling  which helped signifantly    Kidney stones     Mitral valve prolapse     Rheumatic fever     RLS (restless legs syndrome)     Sleep apnea In 50s    Boarderline with snoring. 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/20/2020    Vitreous disorder     seperation with residual floaters       Past Surgical History:   Procedure Laterality Date    CARDIAC CATHETERIZATION N/A 08/16/2024    Procedure: Left Heart Cath;  Surgeon: Chuy Hardy MD;  Location: [facility] CATH INVASIVE LOCATION;  Service: Cardiovascular;  Laterality: N/A;    CARDIAC CATHETERIZATION  8/16/2024    COLONOSCOPY  3/2023    Due for colonoscopy and upper end oscopy    SKIN BIOPSY      4 SKs and AKs     WISDOM TOOTH EXTRACTION         Social History     Socioeconomic History    Marital status:      Spouse name: Lisa   Tobacco Use    Smoking status: Never     Passive exposure: Never    Smokeless tobacco: Never   Vaping Use    Vaping status: Never Used   Substance and Sexual Activity    Alcohol use: Yes     Alcohol/week: 6.0 standard drinks of alcohol     Types: 3 Glasses of wine, 3 Drinks containing 0.5 oz of alcohol per week     Comment: GLASS OF WINE OR COCKTAIL SEVERAL TIMES / WEEK.    Drug use: Never    Sexual activity: Not Currently     Partners: Female     Birth control/protection: Hysterectomy       Family History   Problem Relation Age of Onset    Breast cancer Mother 39    Heart disease Father     Hypertension Father     Diabetes type I Father     Colon polyps Father     Mitral valve prolapse Brother     Heart disease Brother         valve replacement from infection in j luis valve    Psoriasis Daughter     Arthritis Daughter         some in my thumb joints as well    Psoriasis Cousin     Arthritis Cousin     Colon cancer Neg Hx     Lung cancer Neg Hx         No Known Allergies    Current Outpatient Medications   Medication Instructions    amLODIPine (NORVASC) 5 mg, Oral, Daily    atorvastatin (LIPITOR) 20 mg, Oral, Every Evening     "fexofenadine (ALLEGRA) 180 mg, Daily    fluticasone (FLONASE) 50 MCG/ACT nasal spray 2 sprays, Daily    multivitamin with minerals tablet tablet 1 tablet, Daily    omeprazole (PRILOSEC) 20 mg, Oral, Daily       Vitals:    02/27/25 1400   BP: 116/72   BP Location: Left arm   Patient Position: Sitting   Cuff Size: Adult   Pulse: 76   SpO2: 98%   Weight: 76.2 kg (168 lb)   Height: 182.9 cm (72\")     Body mass index is 22.78 kg/m².    PHYSICAL EXAM:    General Appearance:   well developed  well nourished  Neck:  thyroid not enlarged  supple  Respiratory:  no respiratory distress  normal breath sounds  no rales  Cardiovascular:  no jugular venous distention  regular rhythm  apical impulse normal  S1 normal, S2 normal  no S3, no S4   1/6 apical systolic murmur  no rub, no thrill  lower extremity edema: none    Skin:   warm, dry    RESULTS:   Procedures    Results for orders placed during the hospital encounter of 07/19/24    Adult Transthoracic Echo Complete W/ Cont if Necessary Per Protocol    Interpretation Summary    Left ventricular systolic function is normal. Calculated left ventricular EF = 57.3% Normal left ventricular cavity size and wall thickness noted. All left ventricular wall segments contract normally. Normal left atrial pressure.    Normal left atrial cavity size noted.    The aortic valve exhibits sclerosis. There is mild thickening of the aortic valve. The aortic valve appears trileaflet. Trace aortic valve regurgitation is present. No aortic valve stenosis is present.    There is mild bileaflet mitral valve prolapse present. Moderate mitral valve regurgitation is present. No significant mitral valve stenosis is present.    The tricuspid valve is normal in structure. Mild tricuspid valve regurgitation is present. Estimated right ventricular systolic pressure from tricuspid regurgitation is normal (<35 mmHg).    Mild dilation of the ascending aorta is present. Ascending aorta = 3.9 cm    Labs:  Lab " "Results   Component Value Date    CHOL 114 08/16/2024    TRIG 46 08/16/2024    HDL 47 08/16/2024    LDL 56 08/16/2024    AST 28 08/16/2024    ALT 30 08/16/2024     No results found for: \"HGBA1C\"  Creatinine   Date Value Ref Range Status   08/16/2024 1.02 0.76 - 1.27 mg/dL Final   07/08/2024 1.06 0.76 - 1.27 mg/dL Final   03/11/2024 1.09 0.76 - 1.27 mg/dL Final     eGFR Non  Amer   Date Value Ref Range Status   03/01/2021 76 >60 mL/min/1.73 Final   12/20/2020 76 >60 mL/min/1.73 Final   02/27/2020 70 >60 mL/min/1.73 Final         ASSESSMENT:  Problem List Items Addressed This Visit          Cardiac and Vasculature    Rheumatic mitral regurgitation - Primary (Chronic)    Relevant Medications    amLODIPine (NORVASC) 5 MG tablet    Other Relevant Orders    Adult Transthoracic Echo Complete w/ Color, Spectral and Contrast if necessary per protocol    History of rheumatic fever (Chronic)    MVP (mitral valve prolapse)    Relevant Medications    amLODIPine (NORVASC) 5 MG tablet    Other Relevant Orders    Adult Transthoracic Echo Complete w/ Color, Spectral and Contrast if necessary per protocol    Precordial chest pain    ALEXANDER (dyspnea on exertion)    Abnormal stress test    Overview     8/16/2024: Normal LHC at [facility]            PLAN:  1.  Chest pain with abnormal nuclear stress test:  LHC 08/2024 with normal coronaries, false positive stress test  Continue risk factor modification   Active and asymptomatic  Continue atorvastatin, amlodipine.    2.  Moderate mitral valve regurgitation:  Rheumatic mitral valve regurgitation with mild bileaflet prolapse  Repeat echocardiogram around 8/2026  Asymptomatic without dyspnea or CHF symptoms at this time. Discussed warning signs/symptoms to monitor and notify us for      3.  Hyperlipidemia:  Well-controlled on atorvastatin 20 mg daily, continue.    4.  Hypertension:  Goal blood pressure less than 130/80, continue amlodipine. BP well controlled   Continue low-sodium diet   "       Advance Care Planning   ACP discussion was held with the patient during this visit. Patient has an advance directive in EMR which is still valid.           Follow-up   Return in about 1 year (around 2/27/2026) for with RDS .        Electronically signed by Augusta De La Vega PA-C, 02/27/25, 2:26 PM EST.

## 2025-03-11 ENCOUNTER — PATIENT MESSAGE (OUTPATIENT)
Age: 75
End: 2025-03-11
Payer: MEDICARE

## 2025-03-11 DIAGNOSIS — Z12.5 SCREENING FOR MALIGNANT NEOPLASM OF PROSTATE: ICD-10-CM

## 2025-03-11 DIAGNOSIS — Z13.0 SCREENING FOR DEFICIENCY ANEMIA: ICD-10-CM

## 2025-03-11 DIAGNOSIS — E78.00 PURE HYPERCHOLESTEROLEMIA: Primary | ICD-10-CM

## 2025-03-13 ENCOUNTER — LAB (OUTPATIENT)
Dept: LAB | Facility: HOSPITAL | Age: 75
End: 2025-03-13
Payer: MEDICARE

## 2025-03-13 DIAGNOSIS — E78.00 PURE HYPERCHOLESTEROLEMIA: ICD-10-CM

## 2025-03-13 DIAGNOSIS — Z12.5 SCREENING FOR MALIGNANT NEOPLASM OF PROSTATE: ICD-10-CM

## 2025-03-13 DIAGNOSIS — Z13.0 SCREENING FOR DEFICIENCY ANEMIA: ICD-10-CM

## 2025-03-13 LAB
ALBUMIN SERPL-MCNC: 4.2 G/DL (ref 3.5–5.2)
ALBUMIN/GLOB SERPL: 1.7 G/DL
ALP SERPL-CCNC: 86 U/L (ref 39–117)
ALT SERPL W P-5'-P-CCNC: 30 U/L (ref 1–41)
ANION GAP SERPL CALCULATED.3IONS-SCNC: 11 MMOL/L (ref 5–15)
AST SERPL-CCNC: 35 U/L (ref 1–40)
BILIRUB SERPL-MCNC: 0.8 MG/DL (ref 0–1.2)
BUN SERPL-MCNC: 23 MG/DL (ref 8–23)
BUN/CREAT SERPL: 20.9 (ref 7–25)
CALCIUM SPEC-SCNC: 9.7 MG/DL (ref 8.6–10.5)
CHLORIDE SERPL-SCNC: 106 MMOL/L (ref 98–107)
CHOLEST SERPL-MCNC: 122 MG/DL (ref 0–200)
CO2 SERPL-SCNC: 25 MMOL/L (ref 22–29)
CREAT SERPL-MCNC: 1.1 MG/DL (ref 0.76–1.27)
DEPRECATED RDW RBC AUTO: 43.4 FL (ref 37–54)
EGFRCR SERPLBLD CKD-EPI 2021: 71.3 ML/MIN/1.73
ERYTHROCYTE [DISTWIDTH] IN BLOOD BY AUTOMATED COUNT: 12.7 % (ref 12.3–15.4)
GLOBULIN UR ELPH-MCNC: 2.5 GM/DL
GLUCOSE SERPL-MCNC: 78 MG/DL (ref 65–99)
HCT VFR BLD AUTO: 43.1 % (ref 37.5–51)
HDLC SERPL-MCNC: 48 MG/DL (ref 40–60)
HGB BLD-MCNC: 14.4 G/DL (ref 13–17.7)
LDLC SERPL CALC-MCNC: 62 MG/DL (ref 0–100)
LDLC/HDLC SERPL: 1.33 {RATIO}
MCH RBC QN AUTO: 31.3 PG (ref 26.6–33)
MCHC RBC AUTO-ENTMCNC: 33.4 G/DL (ref 31.5–35.7)
MCV RBC AUTO: 93.7 FL (ref 79–97)
PLATELET # BLD AUTO: 211 10*3/MM3 (ref 140–450)
PMV BLD AUTO: 10.9 FL (ref 6–12)
POTASSIUM SERPL-SCNC: 4.2 MMOL/L (ref 3.5–5.2)
PROT SERPL-MCNC: 6.7 G/DL (ref 6–8.5)
PSA SERPL-MCNC: 1 NG/ML (ref 0–4)
RBC # BLD AUTO: 4.6 10*6/MM3 (ref 4.14–5.8)
SODIUM SERPL-SCNC: 142 MMOL/L (ref 136–145)
TRIGL SERPL-MCNC: 51 MG/DL (ref 0–150)
VLDLC SERPL-MCNC: 12 MG/DL (ref 5–40)
WBC NRBC COR # BLD AUTO: 4.88 10*3/MM3 (ref 3.4–10.8)

## 2025-03-13 PROCEDURE — 80053 COMPREHEN METABOLIC PANEL: CPT

## 2025-03-13 PROCEDURE — 85027 COMPLETE CBC AUTOMATED: CPT

## 2025-03-13 PROCEDURE — 80061 LIPID PANEL: CPT

## 2025-03-13 PROCEDURE — G0103 PSA SCREENING: HCPCS

## 2025-03-18 ENCOUNTER — OFFICE VISIT (OUTPATIENT)
Age: 75
End: 2025-03-18
Payer: MEDICARE

## 2025-03-18 VITALS
OXYGEN SATURATION: 98 % | SYSTOLIC BLOOD PRESSURE: 118 MMHG | WEIGHT: 168.5 LBS | HEIGHT: 72 IN | DIASTOLIC BLOOD PRESSURE: 76 MMHG | BODY MASS INDEX: 22.82 KG/M2 | HEART RATE: 76 BPM

## 2025-03-18 DIAGNOSIS — I10 PRIMARY HYPERTENSION: ICD-10-CM

## 2025-03-18 DIAGNOSIS — R35.1 BENIGN PROSTATIC HYPERPLASIA WITH NOCTURIA: ICD-10-CM

## 2025-03-18 DIAGNOSIS — E78.00 PURE HYPERCHOLESTEROLEMIA: ICD-10-CM

## 2025-03-18 DIAGNOSIS — F43.21 GRIEF REACTION: ICD-10-CM

## 2025-03-18 DIAGNOSIS — K22.70 BARRETT'S ESOPHAGUS WITHOUT DYSPLASIA: ICD-10-CM

## 2025-03-18 DIAGNOSIS — Z00.00 MEDICARE ANNUAL WELLNESS VISIT, SUBSEQUENT: Primary | ICD-10-CM

## 2025-03-18 DIAGNOSIS — N40.1 BENIGN PROSTATIC HYPERPLASIA WITH NOCTURIA: ICD-10-CM

## 2025-03-18 DIAGNOSIS — K21.9 GASTROESOPHAGEAL REFLUX DISEASE, UNSPECIFIED WHETHER ESOPHAGITIS PRESENT: ICD-10-CM

## 2025-03-18 PROBLEM — R03.0 ELEVATED BLOOD PRESSURE READING WITHOUT DIAGNOSIS OF HYPERTENSION: Status: RESOLVED | Noted: 2024-07-08 | Resolved: 2025-03-18

## 2025-03-18 RX ORDER — AMLODIPINE BESYLATE 5 MG/1
2.5 TABLET ORAL DAILY
COMMUNITY

## 2025-03-18 RX ORDER — ATORVASTATIN CALCIUM 20 MG/1
20 TABLET, FILM COATED ORAL EVERY EVENING
Qty: 90 TABLET | Refills: 3 | Status: SHIPPED | OUTPATIENT
Start: 2025-03-18

## 2025-03-18 RX ORDER — OMEPRAZOLE 20 MG/1
20 CAPSULE, DELAYED RELEASE ORAL DAILY
Qty: 90 CAPSULE | Refills: 3 | Status: SHIPPED | OUTPATIENT
Start: 2025-03-18

## 2025-03-18 RX ORDER — DOXAZOSIN 1 MG/1
1 TABLET ORAL NIGHTLY
Qty: 30 TABLET | Refills: 1 | Status: SHIPPED | OUTPATIENT
Start: 2025-03-18

## 2025-03-18 NOTE — PROGRESS NOTES
Subjective   The ABCs of the Annual Wellness Visit  Medicare Wellness Visit      Claude Lucas is a 74 y.o. patient who presents for a Medicare Wellness Visit.    The following portions of the patient's history were reviewed and   updated as appropriate: allergies, current medications, past family history, past medical history, past social history, past surgical history, and problem list.    Compared to one year ago, the patient's physical   health is worse.  Compared to one year ago, the patient's mental   health is better.    Recent Hospitalizations:  He was not admitted to the hospital during the last year.     Current Medical Providers:  Patient Care Team:  Monica Yun MD as PCP - General (Family Medicine)  Chandrakant Carr MD as Consulting Physician (Gastroenterology)  Chuy Hardy MD as Consulting Physician (Cardiology)  Augusta De La Vega PA-C as Physician Assistant (Cardiology)  aSniya York MD as Consulting Physician (Dermatology)    Outpatient Medications Prior to Visit   Medication Sig Dispense Refill    amLODIPine (NORVASC) 5 MG tablet Take 0.5 tablets by mouth Daily.      fexofenadine (ALLEGRA) 180 MG tablet Take 1 tablet by mouth Daily. Pt states that he only takes this in the spring time. (Patient taking differently: Take 1 tablet by mouth As Needed. Pt states that he only takes this in the spring time.)      fluticasone (FLONASE) 50 MCG/ACT nasal spray Administer 2 sprays into the nostril(s) as directed by provider Daily.      multivitamin with minerals tablet tablet Take 1 tablet by mouth Daily.      amLODIPine (NORVASC) 5 MG tablet Take 1 tablet by mouth Daily. 90 tablet 1    atorvastatin (LIPITOR) 20 MG tablet Take 1 tablet by mouth Every Evening. 90 tablet 3    omeprazole (priLOSEC) 20 MG capsule Take 1 capsule by mouth Daily. 90 capsule 3     No facility-administered medications prior to visit.     No opioid medication identified on active medication  "list. I have reviewed chart for other potential  high risk medication/s and harmful drug interactions in the elderly.      Aspirin is not on active medication list.  Aspirin use is not indicated based on review of current medical condition/s. Risk of harm outweighs potential benefits.  .    Patient Active Problem List   Diagnosis    Rheumatic mitral regurgitation    Benign prostatic hyperplasia with nocturia    Diverticulosis of large intestine without hemorrhage    Renal stones    Gastroesophageal reflux disease    Schatzki's ring    History of rheumatic fever    Pure hypercholesterolemia    MVP (mitral valve prolapse)    FH: colon polyps    Memory changes    Chronic right hip pain    Palpitations    Ayala's esophagus without dysplasia    Precordial chest pain    ALEXANDER (dyspnea on exertion)    Aneurysm of ascending aorta without rupture    Anginal equivalent    Abnormal stress test    Primary hypertension     Advance Care Planning Advance Directive is on file.  ACP discussion was held with the patient during this visit. Patient has an advance directive in EMR which is still valid.             Objective   Vitals:    03/18/25 0812   BP: 118/76   BP Location: Left arm   Patient Position: Sitting   Cuff Size: Adult   Pulse: 76   SpO2: 98%   Weight: 76.4 kg (168 lb 8 oz)   Height: 182.9 cm (72\")   PainSc: 2    PainLoc: Hand       Estimated body mass index is 22.85 kg/m² as calculated from the following:    Height as of this encounter: 182.9 cm (72\").    Weight as of this encounter: 76.4 kg (168 lb 8 oz).    BMI is within normal parameters. No other follow-up for BMI required.           Does the patient have evidence of cognitive impairment? No  Forgets names at time.     Lab Results   Component Value Date    TRIG 51 03/13/2025    HDL 48 03/13/2025    LDL 62 03/13/2025    VLDL 12 03/13/2025                                                                                                Health  Risk Assessment    Smoking " Status:  Social History     Tobacco Use   Smoking Status Never    Passive exposure: Never   Smokeless Tobacco Never     Alcohol Consumption:  Social History     Substance and Sexual Activity   Alcohol Use Yes    Alcohol/week: 6.0 standard drinks of alcohol    Types: 3 Glasses of wine, 3 Drinks containing 0.5 oz of alcohol per week    Comment: GLASS OF WINE OR COCKTAIL SEVERAL TIMES / WEEK.       Fall Risk Screen  CYNTHIA Fall Risk Assessment was completed, and patient is at LOW risk for falls.Assessment completed on:3/18/2025    Depression Screening   Little interest or pleasure in doing things? Not at all   Feeling down, depressed, or hopeless? Not at all   PHQ-2 Total Score 0      Health Habits and Functional and Cognitive Screening:      3/11/2025    10:31 PM   Functional & Cognitive Status   Do you have difficulty preparing food and eating? No   Do you have difficulty bathing yourself, getting dressed or grooming yourself? No   Do you have difficulty using the toilet? No   Do you have difficulty moving around from place to place? No   Do you have trouble with steps or getting out of a bed or a chair? No   Current Diet Limited Junk Food   Dental Exam Up to date   Eye Exam Up to date   Exercise (times per week) 2 times per week   Current Exercises Include Bicycling Outdoors;Walking   Do you need help using the phone?  No   Are you deaf or do you have serious difficulty hearing?  No   Do you need help to go to places out of walking distance? No   Do you need help shopping? No   Do you need help preparing meals?  No   Do you need help with housework?  No   Do you need help with laundry? No   Do you need help taking your medications? No   Do you need help managing money? No   Do you ever drive or ride in a car without wearing a seat belt? No   Have you felt unusual stress, anger or loneliness in the last month? No   Who do you live with? Spouse   If you need help, do you have trouble finding someone available to you?  No   Have you been bothered in the last four weeks by sexual problems? No   Do you have difficulty concentrating, remembering or making decisions? No           Age-appropriate Screening Schedule:  Refer to the list below for future screening recommendations based on patient's age, sex and/or medical conditions. Orders for these recommended tests are listed in the plan section. The patient has been provided with a written plan.    Health Maintenance List  Health Maintenance   Topic Date Due    COVID-19 Vaccine (8 - 2024-25 season) 03/18/2025    LIPID PANEL  03/13/2026    ANNUAL WELLNESS VISIT  03/18/2026    COLORECTAL CANCER SCREENING  04/26/2028    TDAP/TD VACCINES (3 - Td or Tdap) 02/28/2030    HEPATITIS C SCREENING  Completed    INFLUENZA VACCINE  Completed    Pneumococcal Vaccine 50+  Completed    ZOSTER VACCINE  Completed                                                                                                                                              Immunization History   Administered Date(s) Administered    ABRYSVO (RSV, 60+ or pregnant women 32-36 wks) 10/06/2023    COVID-19 (PFIZER) 12YRS+ (COMIRNATY) 10/06/2023, 09/18/2024    COVID-19 (PFIZER) BIVALENT 12+YRS 12/21/2022    COVID-19 (PFIZER) Purple Cap Monovalent 03/08/2021, 03/27/2021, 09/08/2021    Covid-19 (Pfizer) Gray Cap Monovalent 03/31/2022    Fluad Quad 65+ 10/09/2020    Fluzone High-Dose 65+YRS 10/23/2017, 10/11/2018, 10/03/2019, 09/18/2024    Fluzone High-Dose 65+yrs 09/24/2021, 10/19/2022, 10/19/2023    Influenza, Unspecified 10/06/2023    Pneumococcal Conjugate 13-Valent (PCV13) 11/27/2017    Pneumococcal Conjugate 20-Valent (PCV20) 10/19/2022    Pneumococcal Polysaccharide (PPSV23) 02/22/2019    Shingrix 03/16/2019, 06/04/2019    Tdap 01/01/2010, 02/28/2020    Zostavax 01/01/2015       CMS Preventative Services Quick Reference  Risk Factors Identified During Encounter  Depression/Dysphoria: Referral to Mental Health  specialist  Immunizations Discussed/Encouraged: COVID19    The above risks/problems have been discussed with the patient.  Pertinent information has been shared with the patient in the After Visit Summary.  An After Visit Summary and PPPS were made available to the patient.    Follow Up:   Next Medicare Wellness visit to be scheduled in 1 year.         Additional E&M Note during same encounter follows:  Patient has additional, significant, and separately identifiable condition(s)/problem(s) that require work above and beyond the Medicare Wellness Visit     Chief Complaint  Medicare Wellness-subsequent    Subjective    HPI  Milo is also being seen today for additional medical problem/s.       The patient is a 74-year-old male presenting for a Medicare wellness visit.    He reports a slight decline in his physical health compared to the previous year, with increased difficulty in performing certain tasks and experiencing more aches following exertion. Despite these changes, he does not identify any significant health issues. His mental health remains stable or has improved over the past year. He has undergone several procedures but has not required overnight hospital admission within the past year. . He is not on aspirin therapy. He has a living will dated 2013. He reports no dysphagia. He has a history of Ayala's esophagus and is on omeprazole 20 mg daily. He experienced mild heartburn prior to his upper endoscopy, which was managed with omeprazole. He typically does not experience heartburn. He is due for a refill of his omeprazole prescription. He is on a 5-year plan for colonoscopy. He has completed his shingles series, is up to date on his tetanus vaccine, has received the RSV vaccine, and his pneumonia series is current. He received his seasonal influenza and COVID-19 vaccines in the fall. He is planning a trip to [place] at the end of April 2025. He is curious about the measles vaccine. He continues to  "take atorvastatin nightly for cholesterol management. He reports no difficulty swallowing.    He experiences nocturia, necessitating urination 3 to 4 times per night. He has attempted management with tamsulosin but discontinued it due to side effects of lightheadedness and fatigue. He has also tried over-the-counter supplements, which provided minimal relief. He avoids caffeine after 2:00 PM or 3:00 PM and limits food and drink intake in the evening. He occasionally experiences urinary incontinence when unable to reach the bathroom promptly. He had a kidney stone in 2017, which required a procedure for removal.     He has moderate mitral valve leakage and is scheduled for an echocardiogram next year for monitoring. He was advised that if the condition remains stable, no intervention is necessary, but if it worsens, a surgical procedure may be required.    He has a home blood pressure monitor and checks his blood pressure regularly, which typically ranges between 120 to 125 systolic and 80 to 90 diastolic.    He is seeking counseling services following the tragic loss of his granddaughter, who was murdered by her estranged , who then committed suicide. He is interested in understanding whether Medicare covers counseling services.    FAMILY HISTORY  The patient has a family history of polyps.    MEDICATIONS  Current: amlodipine, atorvastatin, omeprazole  Past: tamsulosin    IMMUNIZATIONS  The patient has completed the shingles series, is up to date on tetanus, received the RSV vaccine, pneumonia series is good, and received seasonal flu and COVID vaccines in the fall.            Objective   Vital Signs:  /76 (BP Location: Left arm, Patient Position: Sitting, Cuff Size: Adult)   Pulse 76   Ht 182.9 cm (72\")   Wt 76.4 kg (168 lb 8 oz)   SpO2 98%   BMI 22.85 kg/m²   Physical Exam  Exam conducted with a chaperone present.   Constitutional:       General: He is not in acute distress.  HENT:      Right " Ear: Tympanic membrane and ear canal normal.      Left Ear: Tympanic membrane and ear canal normal.      Nose: No congestion or rhinorrhea.      Mouth/Throat:      Mouth: Mucous membranes are moist.      Pharynx: No oropharyngeal exudate or posterior oropharyngeal erythema.   Eyes:      General:         Right eye: No discharge.         Left eye: No discharge.      Conjunctiva/sclera: Conjunctivae normal.   Neck:      Thyroid: No thyromegaly.   Cardiovascular:      Rate and Rhythm: Normal rate and regular rhythm.   Pulmonary:      Effort: Pulmonary effort is normal.      Breath sounds: Normal breath sounds.   Abdominal:      Palpations: Abdomen is soft. There is no hepatomegaly.      Tenderness: There is no abdominal tenderness.   Genitourinary:     Prostate: Enlarged. Not tender and no nodules present.      Rectum: No mass, tenderness, anal fissure or external hemorrhoid. Normal anal tone.   Musculoskeletal:      Cervical back: Neck supple.      Right lower leg: No edema.      Left lower leg: No edema.   Lymphadenopathy:      Head:      Right side of head: No submandibular, preauricular or posterior auricular adenopathy.      Left side of head: No submandibular, preauricular or posterior auricular adenopathy.      Cervical: No cervical adenopathy.   Skin:     General: Skin is warm.   Neurological:      Mental Status: He is alert and oriented to person, place, and time.   Psychiatric:         Mood and Affect: Mood normal.         Behavior: Behavior normal.         Thought Content: Thought content normal.         Judgment: Judgment normal.           Vital Signs  Blood pressure is 118/76.      The following data was reviewed by: Monica Yun MD on 03/18/2025:    Common labs          7/8/2024    08:46 8/16/2024    06:39 3/13/2025    08:07   Common Labs   Glucose 87  86  78    BUN 19  20  23    Creatinine 1.06  1.02  1.10    Sodium 142  142  142    Potassium 4.1  4.2  4.2    Chloride 105  108  106    Calcium 9.5   8.8  9.7    Albumin 4.3  4.1  4.2    Total Bilirubin 1.0  0.6  0.8    Alkaline Phosphatase 75  80  86    AST (SGOT) 29  28  35    ALT (SGPT) 23  30  30    WBC 4.95  4.48  4.88    Hemoglobin 14.3  13.7  14.4    Hematocrit 42.9  40.8  43.1    Platelets 204  195  211    Total Cholesterol  114  122    Triglycerides  46  51    HDL Cholesterol  47  48    LDL Cholesterol   56  62    PSA   0.998      PSA          3/13/2025    08:07   PSA   PSA 0.998      Office Visit with Augusta De La Vega PA-C (02/27/2025)   Results  Laboratory Studies  PSA tests are within normal range. LDL cholesterol is above 70. Fasting blood sugar is normal. Creatinine and GFR are well above 60 and maintaining. Electrolytes are normal. Liver tests are normal. White blood cells, platelets are normal, no signs of anemia.           Assessment and Plan        1. Medicare wellness visit.  His PSA levels remain within the normal range, with no significant increase observed over the past few years. The goal is to reduce his LDL cholesterol levels to below 70. His current regimen of atorvastatin 20 mg is effectively managing his cholesterol levels. His fasting blood glucose levels are within the normal range, indicating no concerns regarding diabetes. His renal function, as indicated by creatinine and GFR levels, is well above 60 and stable. His electrolyte levels are within the normal range. His hepatic function tests are also normal. His complete blood count shows normal white blood cell count, no signs of anemia, and normal platelet count. His most recent upper endoscopy was performed in April 2023, during which he also underwent dilation. His last colonoscopy was performed in 2023. He has completed his shingles series, is up to date on his tetanus vaccine, has received the RSV vaccine, and his pneumonia series is current. He received his seasonal influenza and COVID-19 vaccines in the fall. Given his history of Ayala's esophagus, it is advisable  to continue with acid suppression therapy. Given his family history of polyps, it is recommended that he undergo colonoscopies at 5-year intervals. He is due for a colonoscopy in 2028, provided he does not exhibit any other symptoms. The CDC recommends that adults over 65 consider receiving the COVID-19 vaccine every 6 months. He last received the vaccine in September 2024 and is now eligible for another dose. He is advised to receive the measles vaccine, particularly if he plans to travel to areas where he may come into contact with the virus. He is advised to continue with his current dose of atorvastatin. He is advised to monitor his blood pressure every morning while on doxazosin and to contact the office if he experiences low readings. He is advised to check his blood pressure every 6 months. He is advised to monitor his blood pressure every morning while on doxazosin and to contact the office if he experiences low readings. A refill for his atorvastatin prescription will be provided. A refill for his omeprazole prescription will be sent to [facility]. Annual lab tests are recommended unless symptoms necessitate further investigation.    2.  BPH with nocturia.  Uncontrolled.  He reports getting up to urinate three to four times at night and has experienced some incontinence.  Prior side effects with tamsulosin.  A prescription for doxazosin 1 mg will be provided, to be taken at night. This medication may also lower blood pressure, so he should monitor his blood pressure regularly. If he experiences side effects from doxazosin, Rapaflo will be considered as an alternative. If Rapaflo is ineffective, finasteride and dutasteride will be considered.    3.  Hyperlipidemia.  Well-controlled with atorvastatin 20 mg nightly.    4. Hypertension.  His blood pressure is well-controlled today at 118/76 mmHg. He is currently taking amlodipine 2.5 mg daily, which he adjusted himself from 5 mg due to lightheadedness. His blood  pressure should be monitored regularly, especially while initiating doxazosin.    5. Grief counseling.  New.  He is seeking counseling services following the tragic loss of his granddaughter, who was murdered by her estranged , who then committed suicide. He is interested in understanding whether Medicare covers counseling services. He is advised to seek counseling services through [facility] or [facility] to help process his grief. Both in-person and virtual counseling options are available, and sessions should be covered with co-pays through insurance.        Follow-up  The patient is scheduled for a follow-up visit in the fall to monitor his blood pressure, and for an annual physical examination in the spring.    PROCEDURE  Upper endoscopy and dilation were performed in April 2023. Colonoscopy was performed in 2018.         Follow Up   Return in about 6 months (around 9/18/2025) for Office visit HTN AND , MWV and fasting labs 1 year.  Patient was given instructions and counseling regarding his condition or for health maintenance advice. Please see specific information pulled into the AVS if appropriate.  Patient or patient representative verbalized consent for the use of Ambient Listening during the visit with  Monica Yun MD for chart documentation. 3/18/2025  16:34 EDT  Electronically signed by Monica Yun MD, 03/18/25, 4:35 PM EDT.

## 2025-03-18 NOTE — PATIENT INSTRUCTIONS
You are due for a Covid 19 vaccination. (provides protection against Covid 19 Viral Infection) Please  talk to your pharmacist and get the immunization at your local pharmacy at your earliest convenience. Please click on the link for more information about this vaccine.   https://www.cdc.gov/coronavirus/2019-ncov/vaccines/stay-up-to-date.html        Medicare Wellness  Personal Prevention Plan of Service     Date of Office Visit:    Encounter Provider:  Monica Yun MD  Place of Service:  [facility]  Patient Name: Claude Lucas  :  1950    As part of the Medicare Wellness portion of your visit today, we are providing you with this personalized preventive plan of services (PPPS). This plan is based upon recommendations of the [place] Preventive Services Task Force (USPSTF) and the Advisory Committee on Immunization Practices (ACIP).    This lists the preventive care services that should be considered, and provides dates of when you are due. Items listed as completed are up-to-date and do not require any further intervention.    Health Maintenance   Topic Date Due   • ANNUAL WELLNESS VISIT  2025   • COVID-19 Vaccine ( season) 2025   • LIPID PANEL  2026   • COLORECTAL CANCER SCREENING  2028   • TDAP/TD VACCINES (3 - Td or Tdap) 2030   • HEPATITIS C SCREENING  Completed   • INFLUENZA VACCINE  Completed   • Pneumococcal Vaccine 50+  Completed   • ZOSTER VACCINE  Completed       No orders of the defined types were placed in this encounter.      No follow-ups on file.

## 2025-03-27 ENCOUNTER — PATIENT MESSAGE (OUTPATIENT)
Age: 75
End: 2025-03-27
Payer: MEDICARE

## 2025-06-09 ENCOUNTER — PATIENT MESSAGE (OUTPATIENT)
Age: 75
End: 2025-06-09
Payer: MEDICARE

## 2025-06-16 ENCOUNTER — OFFICE VISIT (OUTPATIENT)
Age: 75
End: 2025-06-16
Payer: MEDICARE

## 2025-06-16 VITALS
SYSTOLIC BLOOD PRESSURE: 124 MMHG | HEIGHT: 72 IN | BODY MASS INDEX: 23.32 KG/M2 | HEART RATE: 76 BPM | OXYGEN SATURATION: 98 % | WEIGHT: 172.13 LBS | DIASTOLIC BLOOD PRESSURE: 64 MMHG

## 2025-06-16 DIAGNOSIS — I10 PRIMARY HYPERTENSION: ICD-10-CM

## 2025-06-16 DIAGNOSIS — R35.1 BENIGN PROSTATIC HYPERPLASIA WITH NOCTURIA: Primary | ICD-10-CM

## 2025-06-16 DIAGNOSIS — N40.1 BENIGN PROSTATIC HYPERPLASIA WITH NOCTURIA: Primary | ICD-10-CM

## 2025-06-16 PROCEDURE — 1125F AMNT PAIN NOTED PAIN PRSNT: CPT | Performed by: FAMILY MEDICINE

## 2025-06-16 PROCEDURE — G2211 COMPLEX E/M VISIT ADD ON: HCPCS | Performed by: FAMILY MEDICINE

## 2025-06-16 PROCEDURE — 3078F DIAST BP <80 MM HG: CPT | Performed by: FAMILY MEDICINE

## 2025-06-16 PROCEDURE — 3074F SYST BP LT 130 MM HG: CPT | Performed by: FAMILY MEDICINE

## 2025-06-16 PROCEDURE — 1159F MED LIST DOCD IN RCRD: CPT | Performed by: FAMILY MEDICINE

## 2025-06-16 PROCEDURE — 1160F RVW MEDS BY RX/DR IN RCRD: CPT | Performed by: FAMILY MEDICINE

## 2025-06-16 PROCEDURE — 99214 OFFICE O/P EST MOD 30 MIN: CPT | Performed by: FAMILY MEDICINE

## 2025-06-16 RX ORDER — FINASTERIDE 5 MG/1
5 TABLET, FILM COATED ORAL DAILY
Qty: 30 TABLET | Refills: 1 | Status: SHIPPED | OUTPATIENT
Start: 2025-06-16

## 2025-06-16 NOTE — PROGRESS NOTES
Chief Complaint  Benign Prostatic Hypertrophy and Hypertension    Subjective        Claude Lucas presents to [facility]    Concern over symptoms regarding the Doxizosin after taking for about 3 weeks  Symptoms are: new.   Onset was in the past 7 days.   Symptoms occur: intermittently.  Symptoms include: fatigue.   Pertinent negative symptoms include no abdominal pain, no anorexia, no joint pain, no change in stool, no chest pain, no chills, no congestion, no cough, no diaphoresis, no fever, no headaches, no joint swelling, no myalgias, no nausea, no neck pain, no numbness, no rash, no sore throat, no swollen glands, no dysuria, no vertigo, no visual change, no vomiting and no weakness.   Other symptom:  Slight light headed sometimes, and difficulty waking up in the AM  Treatment and/or Medications comments include: None   Additional information: Shared this on message to Dr TALLEY and wanted to follow-up with her since the medication is working with some help regarding prostate and urination at night.      History of Present Illness  The patient is a 74-year-old male presenting for a follow-up of benign prostatic hyperplasia (BPH) and hypertension.    He reports experiencing side effects from doxazosin. Initially, he experienced occasional lightheadedness for the first 3 weeks but noticed an improvement in his sleep and less urinary frequency at night, reducing to one or two times per night. He also reported difficulty initiating activities in the morning, which improved when he adjusted his medication schedule to 8:00 PM instead of 10:00 PM.  He discontinued the amlodipine prior to starting the doxazosin has been monitoring his blood pressure.  His blood pressure readings daily have been low, noting readings of 112/60, 113, and 115 over three consecutive days. During this period, he felt lethargic and listless, although he did not have a fever and was able to assist his son with  "moving furniture. He began halving his doxazosin dose on 06/05/2025, which seems to have improved his symptoms. His blood pressure reading this morning was 111, and he reported feeling restless and slightly dizzy upon leaving the house. He has observed that his well-being correlates with his blood pressure levels, feeling better when they are higher.     He engaged in cycling last week, covering distances of 20 to 23 miles on Wednesday, Friday, and Saturday without any adverse effects. He also worked in the yard for a few hours yesterday but did not hydrate adequately during this activity.    Additionally, he mentioned an increased sensitivity to alcohol while on doxazosin, experiencing significant effects even with minimal consumption, leading him to stop drinking.    SOCIAL HISTORY  The patient stopped drinking alcohol after experiencing heightened effects from it while on doxazosin.    Objective   Vital Signs:  /64 (BP Location: Left arm, Patient Position: Sitting)   Pulse 76   Ht 182.9 cm (72\")   Wt 78.1 kg (172 lb 2 oz)   SpO2 98%   BMI 23.34 kg/m²   Estimated body mass index is 23.34 kg/m² as calculated from the following:    Height as of this encounter: 182.9 cm (72\").    Weight as of this encounter: 78.1 kg (172 lb 2 oz).    BMI is within normal parameters. No other follow-up for BMI required.      The following portions of the patient's history were reviewed and updated as appropriate: allergies, current medications, past family history, past medical history, past social history, past surgical history, and problem list.       Physical Exam  Vitals reviewed.   Constitutional:       General: He is not in acute distress.     Appearance: He is not ill-appearing.   Cardiovascular:      Rate and Rhythm: Normal rate and regular rhythm.   Pulmonary:      Effort: Pulmonary effort is normal.      Breath sounds: Normal breath sounds.   Neurological:      Mental Status: He is alert.   Psychiatric:         " Mood and Affect: Mood normal.        Result Review :  The following data was reviewed by: Monica Yun MD on 06/16/2025:  PSA          3/13/2025    08:07   PSA   PSA 0.998          Patient Entered Attachment - [id number] (06/09/2025)  BP log     Assessment and Plan   Diagnoses and all orders for this visit:    1. Benign prostatic hyperplasia with nocturia (Primary)  -     finasteride (Proscar) 5 MG tablet; Take 1 tablet by mouth Daily.  Dispense: 30 tablet; Refill: 1    2. Primary hypertension             Assessment & Plan  1. Benign Prostatic Hyperplasia.  - Experiencing dizziness as a side effect of doxazosin, despite improved urinary flow.  - Increased dizziness and lethargy noted with lower blood pressure readings.  - Transition to finasteride initiated to reduce prostate volume without affecting blood pressure.  - Prescription for a 30-day supply of finasteride with one refill provided; will extend to a 90-day supply if beneficial.    2. Hypertension.  - Blood pressure readings have been low (e.g., 111/60), contributing to dizziness.  - Discontinuation of doxazosin expected to increase blood pressure.  - Advised to monitor blood pressure and resume amlodipine when readings are consistently in the 120s to 130s range.  - Blood pressure and heart rate currently normal at 124/64 and 76, respectively.    Follow-up  - Follow-up appointment scheduled in 1 month.      Follow Up   Return in about 1 month (around 7/16/2025) for Office visit BPH, HTN .  Patient was given instructions and counseling regarding his condition or for health maintenance advice. Please see specific information pulled into the AVS if appropriate.         Patient or patient representative verbalized consent for the use of Ambient Listening during the visit with  Monica Yun MD for chart documentation. 6/16/2025  08:53 EDT    Electronically signed by Monica Yun MD, 06/16/25, 8:58 AM EDT.

## 2025-07-01 ENCOUNTER — OFFICE VISIT (OUTPATIENT)
Age: 75
End: 2025-07-01
Payer: COMMERCIAL

## 2025-07-01 VITALS
SYSTOLIC BLOOD PRESSURE: 128 MMHG | HEIGHT: 72 IN | OXYGEN SATURATION: 98 % | HEART RATE: 60 BPM | DIASTOLIC BLOOD PRESSURE: 80 MMHG | WEIGHT: 171.31 LBS | BODY MASS INDEX: 23.2 KG/M2

## 2025-07-01 DIAGNOSIS — N40.1 BENIGN PROSTATIC HYPERPLASIA WITH NOCTURIA: Primary | ICD-10-CM

## 2025-07-01 DIAGNOSIS — M25.512 CHRONIC LEFT SHOULDER PAIN: ICD-10-CM

## 2025-07-01 DIAGNOSIS — R35.1 BENIGN PROSTATIC HYPERPLASIA WITH NOCTURIA: Primary | ICD-10-CM

## 2025-07-01 DIAGNOSIS — Z86.79 H/O: HYPERTENSION: ICD-10-CM

## 2025-07-01 DIAGNOSIS — G89.29 CHRONIC LEFT SHOULDER PAIN: ICD-10-CM

## 2025-07-01 DIAGNOSIS — K22.70 BARRETT'S ESOPHAGUS WITHOUT DYSPLASIA: ICD-10-CM

## 2025-07-01 PROBLEM — I10 PRIMARY HYPERTENSION: Status: RESOLVED | Noted: 2025-03-18 | Resolved: 2025-07-01

## 2025-07-01 PROCEDURE — 99214 OFFICE O/P EST MOD 30 MIN: CPT | Performed by: FAMILY MEDICINE

## 2025-07-01 RX ORDER — CELECOXIB 200 MG/1
200 CAPSULE ORAL DAILY PRN
Qty: 30 CAPSULE | Refills: 3 | Status: SHIPPED | OUTPATIENT
Start: 2025-07-01

## 2025-07-01 RX ORDER — FINASTERIDE 5 MG/1
5 TABLET, FILM COATED ORAL DAILY
Qty: 90 TABLET | Refills: 3 | Status: SHIPPED | OUTPATIENT
Start: 2025-07-01

## 2025-07-01 NOTE — PROGRESS NOTES
Chief Complaint  left shoulder pain     Subjective        Claude Lucas presents to [facility]    Left shoulder pain and discomfort  Symptoms are: chronic.   Onset was 1 to 6 months.   Symptoms occur: constantly.  Symptoms include: joint pain.   Pertinent negative symptoms include no anorexia, no change in stool, no chest pain, no chills, no congestion, no cough, no diaphoresis, no fatigue, no fever, no headaches, no joint swelling, no myalgias, no nausea, no neck pain, no numbness, no rash, no sore throat, no swollen glands, no dysuria, no vertigo, no visual change, no vomiting and no weakness.   Treatment and/or Medications comments include: None   Additional information: Started about 2 months ago but was mild and not significant. Have yried some exercise, but in last week has gotten significant.      History of Present Illness  The patient is a 75-year-old male presenting for left shoulder pain.    He began experiencing intermittent left shoulder pain a few months ago, which he attributes to lifting or other activities. The pain is localized to the left upper arm with a sensation of it being at the end of a tendon. Stretching exercises initially provided some relief, but an increase in pain was noted after a strenuous bike ride on Saturday. Over the next 3 to 4 days, the pain intensified, particularly when raising his arm or performing daily activities. He reports no numbness or tingling in the arm. 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 He has attempted to manage the pain with Voltaren gel and ibuprofen, but these have not been effective. He has not tried heat or ice therapy for his shoulder. The pain does not disrupt his sleep, but he experiences it upon waking and when reaching out too far.    He no longer taking amlodipine and has been monitoring his blood pressure regularly, which has been in  "the low 120s.    He is also taking omeprazole and has not experienced any side effects from it.    He is taking finasteride for his prostate health and reports that it is helping his urinary flow.  He is able to sleep for 3 to 4 hours before needing to urinate at night.  He is not experiencing any dizziness like he had with tamsulosin.    Objective   Vital Signs:  /80 (BP Location: Left arm, Patient Position: Sitting, Cuff Size: Adult)   Pulse 60   Ht 182.9 cm (72\")   Wt 77.7 kg (171 lb 5 oz)   SpO2 98%   BMI 23.23 kg/m²   Estimated body mass index is 23.23 kg/m² as calculated from the following:    Height as of this encounter: 182.9 cm (72\").    Weight as of this encounter: 77.7 kg (171 lb 5 oz).    BMI is within normal parameters. No other follow-up for BMI required.      The following portions of the patient's history were reviewed and updated as appropriate: allergies, current medications, past family history, past medical history, past social history, past surgical history, and problem list.       Physical Exam  Vitals reviewed.   Constitutional:       General: He is not in acute distress.     Appearance: He is not ill-appearing.   Cardiovascular:      Rate and Rhythm: Normal rate and regular rhythm.   Pulmonary:      Effort: Pulmonary effort is normal.      Breath sounds: Normal breath sounds.   Musculoskeletal:      Left shoulder: Tenderness (anterior deltoid, proximal biceps insertion) and bony tenderness (AC) present. No swelling. Decreased range of motion. Normal strength.   Neurological:      Mental Status: He is alert.   Psychiatric:         Mood and Affect: Mood normal.        Result Review :                Assessment and Plan   Diagnoses and all orders for this visit:    1. Benign prostatic hyperplasia with nocturia (Primary)  -     finasteride (Proscar) 5 MG tablet; Take 1 tablet by mouth Daily.  Dispense: 90 tablet; Refill: 3    2. Chronic left shoulder pain  -     Ambulatory Referral to " Physical Therapy for Evaluation & Treatment  -     XR Shoulder 2+ View Left; Future  -     celecoxib (CeleBREX) 200 MG capsule; Take 1 capsule by mouth Daily As Needed for Mild Pain.  Dispense: 30 capsule; Refill: 3             Assessment & Plan  1. Left shoulder pain.  - The left shoulder pain could be due to inflammation in the biceps insertion, acromioclavicular joint, or anterior deltoid muscle.  - Physical exam reveals tenderness in specific areas of the shoulder, reduced range of motion, and pain with certain movements.  - An x-ray of the left shoulder will be ordered to assess for potential arthritis.  - Physical therapy will be initiated with Yobani Poe.   - Celebrex 200 mg will be prescribed for daily use as needed for pain management, with caution regarding potential side effects such as elevated blood pressure and gastrointestinal issues. He is advised against using over-the-counter NSAIDs concurrently with Celebrex but may use acetaminophen if necessary. The prescription will be sent to the pharmacy. If there is no improvement with conservative measures, a referral to an orthopedic specialist will be considered.    2. H/o Hypertension.  - Blood pressure is currently well-controlled at 120/80 mmHg without the use of amlodipine.  - Regular monitoring of blood pressure is advised, especially while using NSAIDs, as they can elevate blood pressure.  - No current medication adjustments needed.    3. Ayala's esophagus.  - Currently on omeprazole and reports no gastrointestinal issues.  - Advised to continue omeprazole as it may provide protective effects against NSAID-induced gastrointestinal side effects.  - No new symptoms reported.    4. Benign prostatic hyperplasia.  - Reports improvement in urinary flow with finasteride and no dizziness as experienced with tamsulosin.  - A 90-day supply of finasteride will be prescribed and sent to the pharmacy.  - Continues to monitor symptoms and will follow up as  needed.      Follow Up   Return if symptoms worsen or fail to improve.  Patient was given instructions and counseling regarding his condition or for health maintenance advice. Please see specific information pulled into the AVS if appropriate.         Patient or patient representative verbalized consent for the use of Ambient Listening during the visit with  Monica Yun MD for chart documentation. 7/1/2025  08:05 EDT    Electronically signed by Monica Yun MD, 07/01/25, 8:11 AM EDT.

## 2025-07-24 ENCOUNTER — OFFICE VISIT (OUTPATIENT)
Age: 75
End: 2025-07-24
Payer: MEDICARE

## 2025-07-24 VITALS
DIASTOLIC BLOOD PRESSURE: 78 MMHG | HEART RATE: 69 BPM | OXYGEN SATURATION: 97 % | WEIGHT: 171.56 LBS | SYSTOLIC BLOOD PRESSURE: 122 MMHG | HEIGHT: 72 IN | BODY MASS INDEX: 23.24 KG/M2

## 2025-07-24 DIAGNOSIS — R35.1 BENIGN PROSTATIC HYPERPLASIA WITH NOCTURIA: Primary | ICD-10-CM

## 2025-07-24 DIAGNOSIS — G89.29 CHRONIC LEFT SHOULDER PAIN: ICD-10-CM

## 2025-07-24 DIAGNOSIS — M25.512 CHRONIC LEFT SHOULDER PAIN: ICD-10-CM

## 2025-07-24 DIAGNOSIS — Z86.79 H/O: HYPERTENSION: ICD-10-CM

## 2025-07-24 DIAGNOSIS — N40.1 BENIGN PROSTATIC HYPERPLASIA WITH NOCTURIA: Primary | ICD-10-CM

## 2025-07-24 PROCEDURE — 1160F RVW MEDS BY RX/DR IN RCRD: CPT | Performed by: FAMILY MEDICINE

## 2025-07-24 PROCEDURE — G2211 COMPLEX E/M VISIT ADD ON: HCPCS | Performed by: FAMILY MEDICINE

## 2025-07-24 PROCEDURE — 1159F MED LIST DOCD IN RCRD: CPT | Performed by: FAMILY MEDICINE

## 2025-07-24 PROCEDURE — 1125F AMNT PAIN NOTED PAIN PRSNT: CPT | Performed by: FAMILY MEDICINE

## 2025-07-24 PROCEDURE — 99214 OFFICE O/P EST MOD 30 MIN: CPT | Performed by: FAMILY MEDICINE

## 2025-07-24 RX ORDER — CELECOXIB 200 MG/1
200 CAPSULE ORAL DAILY PRN
Qty: 90 CAPSULE | Refills: 3 | Status: SHIPPED | OUTPATIENT
Start: 2025-07-24

## 2025-07-24 NOTE — PROGRESS NOTES
Chief Complaint  Hypertension and Benign Prostatic Hypertrophy    Subjective        Claude Lucas presents to [facility]  Hypertension  Associated symptoms: no chest pain, no headaches and no neck pain    Benign Prostatic Hypertrophy  Pertinent negatives include no chills, dysuria, nausea or vomiting.     Symptoms are: recurrent.   Onset was 1 to 6 months.   Symptoms occur: intermittently.  Pertinent negative symptoms include no abdominal pain, no anorexia, no joint pain, no change in stool, no chest pain, no chills, no congestion, no cough, no diaphoresis, no fatigue, no fever, no headaches, no joint swelling, no myalgias, no nausea, no neck pain, no numbness, no rash, no sore throat, no swollen glands, no dysuria, no vertigo, no visual change, no vomiting and no weakness.       History of Present Illness  The patient is a 75-year-old male presenting for a follow-up on hypertension and benign prostatic hypertrophy.    He has been monitoring his blood pressure at home since discontinuing amlodipine. This morning, his initial reading was 137, but after a few deep breaths and some activity, it dropped to 126. He plans to continue cycling as part of his exercise routine. He has noticed occasional irregular heartbeats when using his blood pressure cuff, but this does not occur consistently. His watch has only detected an inconclusive sinus rhythm twice in the past six months. He reports no palpitations or other symptoms associated with these irregular heartbeats.    He reports an improvement in his condition, with better sleep quality and a reduction in nighttime urination from four to two times. He notes that on days when he cycles, he consumes more fluids but does not need to urinate as frequently at night. He can now go up to four hours without needing to use the bathroom. He reports no dizziness or other side effects from his current finasteride.    He has been attending physical  "therapy for his left shoulder, which has shown improvement. The therapist identified weakness and stiffness in his shoulder blade and back areas, which were causing him to overcompensate with the front muscles. They are working on strengthening exercises, and he has noticed significant improvement. He has been attending therapy for about 3.5 weeks and was informed that it typically takes at least six weeks to see full recovery. He has been given a range of abduction exercises to perform at home using bands and weights. He is currently taking celebrex daily for pain management but plans to reduce the frequency to as-needed once the pain subsides.    Social History:  Hobbies: Cycling  Sleep: Reports better sleep quality, not sleeping through the night, wakes up twice to urinate    Objective   Vital Signs:  /78 (BP Location: Left arm, Patient Position: Sitting, Cuff Size: Adult)   Pulse 69   Ht 182.9 cm (72\")   Wt 77.8 kg (171 lb 9 oz)   SpO2 97%   BMI 23.27 kg/m²   Estimated body mass index is 23.27 kg/m² as calculated from the following:    Height as of this encounter: 182.9 cm (72\").    Weight as of this encounter: 77.8 kg (171 lb 9 oz).    BMI is within normal parameters. No other follow-up for BMI required.      The following portions of the patient's history were reviewed and updated as appropriate: allergies, current medications, past family history, past medical history, past social history, past surgical history, and problem list.       Physical Exam  Vitals reviewed.   Constitutional:       General: He is not in acute distress.     Appearance: He is not ill-appearing.   Cardiovascular:      Rate and Rhythm: Normal rate and regular rhythm.      Heart sounds: No murmur heard.  Pulmonary:      Effort: Pulmonary effort is normal.      Breath sounds: Normal breath sounds.   Neurological:      Mental Status: He is alert.   Psychiatric:         Mood and Affect: Mood normal.        Result Review :          "       Assessment and Plan   Diagnoses and all orders for this visit:    1. Benign prostatic hyperplasia with nocturia (Primary)    2. H/O: hypertension    3. Chronic left shoulder pain  -     celecoxib (CeleBREX) 200 MG capsule; Take 1 capsule by mouth Daily As Needed for Mild Pain.  Dispense: 90 capsule; Refill: 3             Assessment & Plan  1. H/o Hypertension.  - Blood pressure readings have been consistently within the normal range, even while on NSAIDs.  - Office reading today was 122/78.  - Advised to maintain intensive aerobic activity for cardiovascular health.  -  If blood pressure consistently exceeds 140/90, antihypertensive medications will be reintroduced.    2. Benign prostatic hypertrophy.  - Significant reduction in nocturia, now waking up only twice per night compared to four times previously.  - No dizziness or other side effects noted   - Continue finasteride.    3. Shoulder pain.  - Attending physical therapy for 3.5 weeks with marked improvement.  - Performing a range of exercises at home, including side abductions and using stronger bands.  - Prescription for 90 tablets Celebrex provided, to be taken as needed.  - Advised to continue current regimen and follow up with physical therapy as planned.    Follow-up: The patient is scheduled for a wellness visit on 03/23/2026, or sooner if any new concerns arise.      Follow Up   Return in about 8 months (around 3/23/2026) for MWV, as scheduled.  Patient was given instructions and counseling regarding his condition or for health maintenance advice. Please see specific information pulled into the AVS if appropriate.         Patient or patient representative verbalized consent for the use of Ambient Listening during the visit with  Monica Yun MD for chart documentation. 7/24/2025  09:03 EDT    Electronically signed by Monica Yun MD, 07/24/25, 9:03 AM EDT.

## (undated) DEVICE — CATH DIAG EXPO M/ PK 5F FL4/FR4 PIG

## (undated) DEVICE — GLIDESHEATH SLENDER STAINLESS STEEL KIT: Brand: GLIDESHEATH SLENDER

## (undated) DEVICE — PK CATH CARD 10

## (undated) DEVICE — CVR PROB ULTRASND/TRANSD W/GEL 7X11IN STRL

## (undated) DEVICE — GW PERIPH GUIDERIGHT STD/EXCHNG/J/TIP SS 0.035IN 5X260CM

## (undated) DEVICE — CATH F6 ST JL 3.5 100CM: Brand: SUPERTORQUE

## (undated) DEVICE — ADULT, W/LG. BACK PAD, RADIOTRANSPARENT ELEMENT AND LEAD WIRE COMPATIBLE W/: Brand: DEFIBRILLATION ELECTRODES

## (undated) DEVICE — MODEL AT P65, P/N 701554-001KIT CONTENTS: HAND CONTROLLER, 3-WAY HIGH-PRESSURE STOPCOCK WITH ROTATING END AND PREMIUM HIGH-PRESSURE TUBING: Brand: ANGIOTOUCH® KIT

## (undated) DEVICE — MODEL BT2000 P/N 700287-012KIT CONTENTS: MANIFOLD WITH SALINE AND CONTRAST PORTS, SALINE TUBING WITH SPIKE AND HAND SYRINGE, TRANSDUCER: Brand: BT2000 AUTOMATED MANIFOLD KIT

## (undated) DEVICE — TR BAND RADIAL ARTERY COMPRESSION DEVICE: Brand: TR BAND